# Patient Record
Sex: FEMALE | Race: ASIAN | NOT HISPANIC OR LATINO | Employment: FULL TIME | URBAN - METROPOLITAN AREA
[De-identification: names, ages, dates, MRNs, and addresses within clinical notes are randomized per-mention and may not be internally consistent; named-entity substitution may affect disease eponyms.]

---

## 2017-03-26 ENCOUNTER — HOSPITAL ENCOUNTER (EMERGENCY)
Facility: HOSPITAL | Age: 32
Discharge: HOME | End: 2017-03-26
Attending: EMERGENCY MEDICINE
Payer: MEDICAID

## 2017-03-26 VITALS
TEMPERATURE: 98 F | HEART RATE: 86 BPM | RESPIRATION RATE: 16 BRPM | SYSTOLIC BLOOD PRESSURE: 114 MMHG | WEIGHT: 110 LBS | BODY MASS INDEX: 21.6 KG/M2 | HEIGHT: 60 IN | DIASTOLIC BLOOD PRESSURE: 76 MMHG | OXYGEN SATURATION: 100 %

## 2017-03-26 DIAGNOSIS — J18.9 PNEUMONIA OF RIGHT LOWER LOBE DUE TO INFECTIOUS ORGANISM: ICD-10-CM

## 2017-03-26 DIAGNOSIS — R05.9 COUGH: Primary | ICD-10-CM

## 2017-03-26 LAB — DEPRECATED S PYO AG THROAT QL EIA: NEGATIVE

## 2017-03-26 PROCEDURE — 87081 CULTURE SCREEN ONLY: CPT

## 2017-03-26 PROCEDURE — 87880 STREP A ASSAY W/OPTIC: CPT

## 2017-03-26 PROCEDURE — 99284 EMERGENCY DEPT VISIT MOD MDM: CPT

## 2017-03-26 RX ORDER — DOXYCYCLINE HYCLATE 100 MG
100 TABLET ORAL EVERY 12 HOURS
Qty: 20 TABLET | Refills: 0 | Status: SHIPPED | OUTPATIENT
Start: 2017-03-26 | End: 2017-04-05

## 2017-03-26 NOTE — ED AVS SNAPSHOT
[facility]  [address] [place] [address]               Thu Jocy Murray   3/26/2017  6:28 AM   ED    Description:  Female : 1985   Department:  [facility]           Your Care was Coordinated By:     Provider Role From To    Bijal Mccullough MD Attending Provider 17 0630 --      Reason for Visit     Sore Throat     Cough           Diagnoses this Visit        Comments    Cough    -  Primary     Pneumonia of right lower lobe due to infectious organism           ED Disposition     ED Disposition Condition Comment    Discharge             To Do List           Follow-up Information     Follow up with Jose Murray MD. Schedule an appointment as soon as possible for a visit in 2 days.    Specialty:  Internal Medicine    Contact information:    [address]  [place] [id number] [phone]         These Medications        Disp Refills Start End    doxycycline (VIBRA-TABS) 100 MG tablet 20 tablet 0 3/26/2017 2017    Take 1 tablet (100 mg total) by mouth every 12 (twelve) hours. - Oral    Pharmacy: [facility] [place] #: [phone]         [facility] On Call     [facility] -  Assistance  Registered nurses in the [facility] provide clinical advisement, health education, appointment booking, and other advisory services.  Call for this free service at [phone].             Medications           Message regarding Medications     Verify the changes and/or additions to your medication regime listed below are the same as discussed with your clinician today.  If any of these changes or additions are incorrect, please notify your healthcare provider.        START taking these NEW medications        Refills    doxycycline (VIBRA-TABS) 100 MG tablet 0    Sig: Take 1 tablet (100 mg total) by mouth every 12 (twelve) hours.    Class: Print    Route: Oral            Verify that the below list of medications is an accurate representation of the medications you are currently taking.  If none reported, the list may be blank. If incorrect, please contact your healthcare provider. Carry this list with you in case of emergency.           Current Medications     doxycycline (VIBRA-TABS) 100 MG tablet Take 1 tablet (100 mg total) by mouth every 12 (twelve) hours.           Clinical Reference Information           Your Vitals Were     BP Pulse Temp Resp Height Weight    113/64 (BP Location: Right arm, Patient Position: Sitting) 89 98.2 °F (36.8 °C) (Oral) 16 5' (1.524 m) 49.9 kg (110 lb)    SpO2 BMI             98% 21.48 kg/m2         Allergies as of 3/26/2017     No Known Allergies      Immunizations Administered on Date of Encounter - 3/26/2017     None      ED Micro, Lab, POCT     Start Ordered       Status Ordering Provider    03/26/17 0658 03/26/17 0657  Rapid strep screen  STAT      Final result     03/26/17 0657 03/26/17 0657  Strep A culture, throat  Once      In process       ED Imaging Orders     Start Ordered       Status Ordering Provider    03/26/17 0658 03/26/17 0657  X-Ray Chest PA And Lateral  1 time imaging      Final result         Discharge Instructions         Take medications as directed. Follow-up with your primary doctor. Return for any new or worsening complaints.  Viêm Ph?i (Ng??i L?n)  Viêm ph?i là veronica?m trùng sâu bên yina ph?i. Nó ? yina các túi không khí nh? (túi ph?i). Viêm ph?i có th? là do vi rút ho?c vi khu?n. Viêm kh?i do vi khu?n th??ng ???c ?i?u tr? b?ng thu?c tr? sinh. Các tr??ng h?p nghiêm tr?ng có th? c?n ???c ?i?u tr? t?i b?nh vi?n. Các tr??ng h?p nh? h?n có th? ???c ?i?u tr? t?i jassi. Các tri?u ch?ng th??ng b?t ??u ?? h?n yina 2 ngày ??u ?i?u tr?.    Ch?m sóc t?i jassi  Làm marisol các h??ng d?n helder ?ây khi ch?m sóc cho b?n thân ? nhà:  · Ngh? ng?i ? nhà yina t? 2 t?i 3 ngày ??u, ho?c cho t?i khi quý v? c?m th?y lina? h?n. ??ng ?? cho b?n thân  c?a quý v? quá m?t m?i khi tr? l?i v?i các ho?t ??ng c?a mình.  · Tránh xa khói thu?c lá - c?a quý v? ho?c nh?ng ng??i khác.  · Quý v? có th? dùng acetaminophen ho?c ibuprofen ?? ki?m ch? c?n ?au, tr? khi m?t thu?c khác ???c kê toa. N?u quý v? b? b?nh cynthia ho?c th?n mãn tính, hãy bàn v?i nhân viên y t? c?a quý v? tr??c khi dùng các thu?c này. Ngoài ra hãy bàn v?i nhân viên y t? n?u quý v? ?ã t?ng b? loét kavitha t? ho?c ch?y máu yina ???ng tiêu hoá. ??ng ??a aspirin cho b?t c? ai d??i 18 tu?i b? b?nh có lên c?n s?t. Nó có th? làm cynthia b? t?n h?i nghiêm tr?ng.  · S? thèm ?n c?a quý v? có th? kém ?i, vì th? m?t ch? ?? ?n nh? c?ng t?t.  · U?ng t? 6 t?i 8 ly ch?t l?ng m?i ngày ?? ch?c ch?n là quý v? có ?? ch?t l?ng. Các th?c u?ng có th? kavitha g?m n??c, n??c u?ng th? varun, n??c ng?t soda mà không có ch?t cà phê in, n??c ép, trà, ho?c súp. Ch?t l?ng s? giúp làm bong các ch?t d?ch ti?t ra yina ph?i. ?i?u này s? t?o s? d? dàng h?n cho quý v? ?? kh?c ??m ra ngoài (??m). N?u quý v? b? b?nh federica ho?c th?n, hãy ki?m tra v?i nhân viên y t? tr??c khi quý v? u?ng thêm ch?t l?ng.  · Dùng thu?c tr? sinh nh? ?ã ???c kê toa cho t?i khi h?t thu?c, m?c dù quý v? c?m th?y ?? h?n helder m?t vài ngày.  Ch?m sóc marisol dõi  Khám marisol dõi v?i nhân viên y t? c?a quý v? yina t? 2 t?i 3 ngày t?i ?ây, ho?c nh? ?ã ???c c?n d?n. ?i?u này là ?? ch?c ch?n thu?c giúp cho quý v? ???c ?? h?n.  N?u quý v? t? 65 tu?i tr? lên, quý v? nên ?i ch?ng ng?a b?nh do ph? c?u khu?n và chích ng?a cúm hàng n?m (b?nh cúm). Quý v? c?ng nên ch?ng ng?a các lo?i này n?u quý v? b? b?nh ph?i mãn tính nh? b?nh reji?n, b?nh tràn khí, ho?c COPD. H?i bác s? c?a quý v? v? ?i?u này.  Khi nào ?i tìm s? khuyên nh? v? y t?  G?i nhân viên y t? ngay n?u quý v? b? b?t c? nh?ng ?i?u nào helder ?ây:  · Quý v? không ?? h?n yina vòng 48 gi? ??u ?i?u tr?  · Ch?ng th? d?c b? tr?m tr?ng h?n  · Th? nhanh (graves h?n 25 nh?p th? m?i phút)  · Ho kh?c ra máu  · Ch?ng ?au ng?c b? tr?m tr?ng h?n khi hít th?  · S?t  t? 102°F (38°C) tr? lên mà không ?? h?n helder khi dùng thu?c gi?m s?t  · Jessica nh??c, chóng m?t, ho?c ng?t x?u b? tr?m tr?ng h?n  · Khát n??c ho?c khô mi?ng b? tr?m tr?ng h?n  · ?au xoang m?i, nh?c ??u, ho?c c? c?ng  · ?au ng?c không ph?i là do b? ho  Date Last Reviewed: 12/23/2014  © 2543-6591 [employer]. [address] [place] [address], [place] [address]. All rights reserved. This information is not intended as a substitute for professional medical care. Always follow your healthcare professional's instructions.          [facility] Sign-Up     Activating your [facility] account is as easy as 1-2-3!     1) Visit [email or web address], select Sign Up Now, enter this activation code and your date of birth, then select Next.  [email or web address]  Expires: 5/10/2017  8:10 AM      2) Create a username and password to use when you visit [facility] in the future and select a security question in case you lose your password and select Next.    3) Enter your e-mail address and click Sign Up!    Additional Information  If you have questions, please e-mail [email or web address] or call [phone] to talk to our [facility] staff. Remember, [facility] is NOT to be used for urgent needs. For medical emergencies, dial 911.          [facility] complies with applicable Federal civil rights laws and does not discriminate on the basis of race, color, national origin, age, disability, or sex.        Language Assistance Services     ATTENTION: Language assistance services are available, free of charge. Please call [phone].      ATENCIÓN: Si habla español, tiene a paul disposición servicios gratuitos de asistencia lingüística. Llame al [phone].     CHÚ Ý: N?u b?n nói Ti?ng Vi?t, có các d?ch v? h? tr? ngôn ng? mi?n phí dành cho b?n. G?i s? [phone].

## 2017-03-26 NOTE — ED NOTES
Pt report received from AZALEA holley. Pt is awake in bed, siderails are up x2, call light in reach. NAD at this time.

## 2017-03-26 NOTE — ED TRIAGE NOTES
32 yo female arrives to ED c/o cough 2-3 days; pt states that she did have a sore throat but it is improving with OTC medication; Pt states that she noticed when she sleeps on left side she has some chest pain. Denies N/V/D; pt woke up around 0200 with difficulty breathing but resolved on own

## 2017-03-26 NOTE — DISCHARGE INSTRUCTIONS
Take medications as directed. Follow-up with your primary doctor. Return for any new or worsening complaints.  Viêm Ph?i (Ng??i L?n)  Viêm ph?i là veronica?m trùng sâu bên yina ph?i. Nó ? yina các túi không khí nh? (túi ph?i). Viêm ph?i có th? là do vi rút ho?c vi khu?n. Viêm kh?i do vi khu?n th??ng ???c ?i?u tr? b?ng thu?c tr? sinh. Các tr??ng h?p nghiêm tr?ng có th? c?n ???c ?i?u tr? t?i b?nh vi?n. Các tr??ng h?p nh? h?n có th? ???c ?i?u tr? t?i jassi. Các tri?u ch?ng th??ng b?t ??u ?? h?n yina 2 ngày ??u ?i?u tr?.    Ch?m sóc t?i jassi  Làm marisol các h??ng d?n helder ?ây khi ch?m sóc cho b?n thân ? nhà:  · Ngh? ng?i ? nhà yina t? 2 t?i 3 ngày ??u, ho?c cho t?i khi quý v? c?m th?y lina? h?n. ??ng ?? cho b?n thân c?a quý v? quá m?t m?i khi tr? l?i v?i các ho?t ??ng c?a mình.  · Tránh xa khói thu?c lá - c?a quý v? ho?c nh?ng ng??i khác.  · Quý v? có th? dùng acetaminophen ho?c ibuprofen ?? ki?m ch? c?n ?au, tr? khi m?t thu?c khác ???c kê toa. N?u quý v? b? b?nh cynthia ho?c th?n mãn tính, hãy bàn v?i nhân viên y t? c?a quý v? tr??c khi dùng các thu?c này. Ngoài ra hãy bàn v?i nhân viên y t? n?u quý v? ?ã t?ng b? loét kavitha t? ho?c ch?y máu yina ???ng tiêu hoá. ??ng ??a aspirin cho b?t c? ai d??i 18 tu?i b? b?nh có lên c?n s?t. Nó có th? làm cynthia b? t?n h?i nghiêm tr?ng.  · S? thèm ?n c?a quý v? có th? kém ?i, vì th? m?t ch? ?? ?n nh? c?ng t?t.  · U?ng t? 6 t?i 8 ly ch?t l?ng m?i ngày ?? ch?c ch?n là quý v? có ?? ch?t l?ng. Các th?c u?ng có th? kavitha g?m n??c, n??c u?ng th? varun, n??c ng?t soda mà không có ch?t cà phê in, n??c ép, trà, ho?c súp. Ch?t l?ng s? giúp làm bong các ch?t d?ch ti?t ra yina ph?i. ?i?u này s? t?o s? d? dàng h?n cho quý v? ?? kh?c ??m ra ngoài (??m). N?u quý v? b? b?nh federica ho?c th?n, hãy ki?m tra v?i nhân viên y t? tr??c khi quý v? u?ng thêm ch?t l?ng.  · Dùng thu?c tr? sinh nh? ?ã ???c kê toa cho t?i khi h?t thu?c, m?c dù quý v? c?m th?y ?? h?n helder m?t vài ngày.  Ch?m sóc marisol dõi  Khám marisol dõi v?i nhân  viên y t? c?a quý v? yina t? 2 t?i 3 ngày t?i ?ây, ho?c nh? ?ã ???c c?n d?n. ?i?u này là ?? ch?c ch?n thu?c giúp cho quý v? ???c ?? h?n.  N?u quý v? t? 65 tu?i tr? lên, quý v? nên ?i ch?ng ng?a b?nh do ph? c?u khu?n và chích ng?a cúm hàng n?m (b?nh cúm). Quý v? c?ng nên ch?ng ng?a các lo?i này n?u quý v? b? b?nh ph?i mãn tính nh? b?nh jessica?n, b?nh tràn khí, ho?c COPD. H?i bác s? c?a quý v? v? ?i?u này.  Khi nào ?i tìm s? khuyên nh? v? y t?  G?i nhân viên y t? ngay n?u quý v? b? b?t c? nh?ng ?i?u nào helder ?ây:  · Quý v? không ?? h?n yina vòng 48 gi? ??u ?i?u tr?  · Ch?ng th? d?c b? tr?m tr?ng h?n  · Th? nhanh (graves h?n 25 nh?p th? m?i phút)  · Ho kh?c ra máu  · Ch?ng ?au ng?c b? tr?m tr?ng h?n khi hít th?  · S?t t? 102°F (38°C) tr? lên mà không ?? h?n helder khi dùng thu?c gi?m s?t  · Jessica nh??c, chóng m?t, ho?c ng?t x?u b? tr?m tr?ng h?n  · Khát n??c ho?c khô mi?ng b? tr?m tr?ng h?n  · ?au xoang m?i, nh?c ??u, ho?c c? c?ng  · ?au ng?c không ph?i là do b? ho  Date Last Reviewed: 12/23/2014  © 6557-0368 [employer]. [address] [place] [address], [place] [address]. All rights reserved. This information is not intended as a substitute for professional medical care. Always follow your healthcare professional's instructions.

## 2017-03-26 NOTE — ED PROVIDER NOTES
"Encounter Date: 3/26/2017    SCRIBE #1 NOTE: I, Maria Esther Baldwin, am scribing for, and in the presence of,  Bijal Mccullough MD . I have scribed the following portions of the note - Other sections scribed: HPI and ROS .       History     Chief Complaint   Patient presents with    Sore Throat     states has sore throat,runny nose and cough feels hard to breath at times    Cough     Review of patient's allergies indicates:  No Known Allergies  HPI Comments: CC: Sore Throat and Cough.  History obtained from patient.  Pt arrived via personal transportation.     HPI: This 31 y.o. Female, who has a history of gestational DM with no other medical problems, presents to the ED for evaluation of a 3 day history of sore throat, non-productive cough, and rhinorrhea. Symptoms are gradual in onset, constant, and moderate. She states that she woke this morning with difficulty breathing. The patient also notes that when laying on her left side, she experiences mild chest pain. The patient denies nausea,throat swelling, difficulty swallowing, vomiting, fever, chills, myalgias, headache, or abdominal pain. She reports no further symptoms. Prior attempted treatment with OTC "flu" medication has provided mild relief as her sore throat has improved. The patient has no allergies to medications. She states that her mother, who is still living, has a history of heart problems. No h/o sudden cardiac death in her family. PCP is Dr. Jocy Murray.     The history is provided by the patient. A  was used ([employer] was used to interpret for Burmese speaking patient. ).     Past Medical History:   Diagnosis Date    Diabetes in pregnancy      History reviewed. No pertinent surgical history.  History reviewed. No pertinent family history.  Social History   Substance Use Topics    Smoking status: Never Smoker    Smokeless tobacco: None    Alcohol use No     Review of Systems   Constitutional: Negative for activity change, chills " and fever.   HENT: Positive for rhinorrhea and sore throat. Negative for congestion, drooling, hearing loss, sinus pressure, sneezing, trouble swallowing and voice change.    Eyes: Negative for redness.   Respiratory: Positive for cough. Negative for apnea, choking, shortness of breath, wheezing and stridor.    Cardiovascular: Positive for chest pain. Negative for palpitations and leg swelling.   Gastrointestinal: Negative for abdominal distention, abdominal pain, constipation, nausea and vomiting.   Endocrine: Negative for polydipsia.   Genitourinary: Negative for difficulty urinating, flank pain, frequency and urgency.   Musculoskeletal: Negative for arthralgias, back pain, myalgias, neck pain and neck stiffness.   Skin: Negative for color change, rash and wound.   Neurological: Negative for seizures, syncope, numbness and headaches.   Hematological: Negative for adenopathy.   Psychiatric/Behavioral: Negative for agitation and confusion.       Physical Exam   Initial Vitals   BP Pulse Resp Temp SpO2   03/26/17 0547 03/26/17 0547 03/26/17 0547 03/26/17 0547 03/26/17 0547   113/64 89 16 98.2 °F (36.8 °C) 98 %     Physical Exam    Nursing note and vitals reviewed.  Constitutional: She appears well-developed and well-nourished. She is not diaphoretic. No distress.   HENT:   Head: Normocephalic and atraumatic.   Right Ear: External ear normal.   Left Ear: External ear normal.   Nose: Nose normal.   Mouth/Throat: Oropharynx is clear and moist. No oropharyngeal exudate.   Eyes: Conjunctivae and EOM are normal. Pupils are equal, round, and reactive to light. Right eye exhibits no discharge. Left eye exhibits no discharge. No scleral icterus.   Neck: Normal range of motion. Neck supple. No thyromegaly present. No tracheal deviation present. No JVD present.   Cardiovascular: Normal rate, regular rhythm, normal heart sounds and intact distal pulses. Exam reveals no gallop and no friction rub.    No murmur  heard.  Pulmonary/Chest: Breath sounds normal. No stridor. No respiratory distress. She has no wheezes. She has no rhonchi. She has no rales. She exhibits no tenderness.   Abdominal: Soft. Bowel sounds are normal. She exhibits no distension. There is no tenderness. There is no rebound and no guarding.   Musculoskeletal: Normal range of motion. She exhibits no edema or tenderness.   Neurological: She is alert and oriented to person, place, and time. She has normal strength. She displays normal reflexes. No cranial nerve deficit or sensory deficit.   Skin: Skin is warm and dry. No rash noted. No erythema. No pallor.   Psychiatric: She has a normal mood and affect. Thought content normal.         ED Course   Procedures  Labs Reviewed   THROAT SCREEN, RAPID   CULTURE, STREP A,  THROAT          X-Rays:   Independently Interpreted Readings:   Other Readings:  Chest x-ray was reviewed.  There is faint patchy right airspace opacification.  There is no pneumothorax or pleural effusion.  No cardiomegaly    Medical Decision Makin-year-old female with no significant past medical history presents the emergency department complaining of cough, sore throat as well as body aches and flulike symptoms for several days.  On exam she is afebrile with stable vital signs.  She is nontoxic appearing.  Differential diagnosis includes but is not limited to viral syndrome, bronchitis, pneumonia, strep pharyngitis.  Considered but do not suspect peritonsillar abscess, retropharyngeal abscess, meningitis, sepsis.  Rapid strep screen is negative.  Chest x-ray is consistent with likely pneumonia.  Will discharge with doxycycline.  Follow-up with her primary care doctor.  Return precautions were given.  She agrees this plan.            Scribe Attestation:   Scribe #1: I performed the above scribed service and the documentation accurately describes the services I performed. I attest to the accuracy of the note.    Attending Attestation:            Physician Attestation for Scribe:  Physician Attestation Statement for Scribe #1: I, Bijal Mccullough MD, reviewed documentation, as scribed by Maria Esther Baldwin  in my presence, and it is both accurate and complete.                 ED Course     Clinical Impression:   The primary encounter diagnosis was Cough. A diagnosis of Pneumonia of right lower lobe due to infectious organism was also pertinent to this visit.          Bijal Mccullough MD  03/26/17 1068

## 2017-03-28 LAB — BACTERIA THROAT CULT: NORMAL

## 2018-12-18 ENCOUNTER — OFFICE VISIT (OUTPATIENT)
Dept: OBSTETRICS AND GYNECOLOGY | Facility: CLINIC | Age: 33
End: 2018-12-18
Payer: MEDICAID

## 2018-12-18 VITALS
BODY MASS INDEX: 25.03 KG/M2 | WEIGHT: 119.25 LBS | TEMPERATURE: 99 F | HEIGHT: 58 IN | SYSTOLIC BLOOD PRESSURE: 124 MMHG | DIASTOLIC BLOOD PRESSURE: 68 MMHG

## 2018-12-18 DIAGNOSIS — Z30.09 FAMILY PLANNING: ICD-10-CM

## 2018-12-18 DIAGNOSIS — Z92.0 HISTORY OF USE OF CONTRACEPTIVE INTRAUTERINE DEVICE (IUD): Primary | ICD-10-CM

## 2018-12-18 PROCEDURE — 99203 OFFICE O/P NEW LOW 30 MIN: CPT | Mod: S$PBB,,, | Performed by: OBSTETRICS & GYNECOLOGY

## 2018-12-18 PROCEDURE — 99213 OFFICE O/P EST LOW 20 MIN: CPT | Mod: PBBFAC | Performed by: OBSTETRICS & GYNECOLOGY

## 2018-12-18 PROCEDURE — 99999 PR PBB SHADOW E&M-EST. PATIENT-LVL III: CPT | Mod: PBBFAC,,, | Performed by: OBSTETRICS & GYNECOLOGY

## 2018-12-18 NOTE — PROGRESS NOTES
"  Subjective:       Patient ID: Jennifer Murray is a 33 y.o. female.    Chief Complaint:  Advice Only (IUD consult)      History of Present Illness  HPI  Patient comes in today requesting IUD insertion  Has had an IUD from [place] previously.  Did "OK with some back pain"  However, she does not want to take the pills secondary to being forgetful.  Would like to try the IUD again.  History of menorrhagia.    Pap on 2018 was normal.      GYN & OB History  Patient's last menstrual period was 2018 (exact date).   Date of Last Pap: No result found    OB History    Para Term  AB Living   3 2 2   1 2   SAB TAB Ectopic Multiple Live Births           2      # Outcome Date GA Lbr Jean Pierre/2nd Weight Sex Delivery Anes PTL Lv   3 AB 18           2 Term 11 40w0d  4.1 kg (9 lb 0.6 oz) M  EPI N DONNA   1 Term 04 40w0d  2.9 kg (6 lb 6.3 oz) M  None N DONNA        Past Medical History:   Diagnosis Date    Diabetes in pregnancy     hemoglobin 6.4 in second trimester       History reviewed. No pertinent surgical history.    Family History   Problem Relation Age of Onset    Heart disease Mother     Hypertension Mother        Social History     Socioeconomic History    Marital status:      Spouse name: None    Number of children: None    Years of education: None    Highest education level: None   Social Needs    Financial resource strain: None    Food insecurity - worry: None    Food insecurity - inability: None    Transportation needs - medical: None    Transportation needs - non-medical: None   Occupational History    None   Tobacco Use    Smoking status: Never Smoker    Smokeless tobacco: Never Used   Substance and Sexual Activity    Alcohol use: No    Drug use: No    Sexual activity: Yes     Partners: Male   Other Topics Concern    None   Social History Narrative     since     He is working offshore    She is a manicurist       Current Outpatient Medications "   Medication Sig Dispense Refill    FLUZONE QUAD 5206-2481, PF, 60 mcg (15 mcg x 4)/0.5 mL Syrg        No current facility-administered medications for this visit.        Review of patient's allergies indicates:  No Known Allergies    Review of Systems  Review of Systems   Constitutional: Negative for activity change, appetite change, chills, fatigue, fever and unexpected weight change.   HENT: Negative for mouth sores.    Respiratory: Negative for cough, shortness of breath and wheezing.    Cardiovascular: Negative for chest pain and palpitations.   Gastrointestinal: Negative for abdominal pain, bloating, blood in stool, constipation, nausea and vomiting.   Endocrine: Negative for diabetes and hot flashes.   Genitourinary: Negative for dyspareunia, dysuria, frequency, hematuria, menorrhagia, menstrual problem, pelvic pain, urgency, vaginal bleeding, vaginal discharge, vaginal pain, dysmenorrhea, urinary incontinence, postcoital bleeding and vaginal odor.   Musculoskeletal: Negative for back pain and myalgias.   Neurological: Negative for seizures and headaches.   Psychiatric/Behavioral: Negative for depression and sleep disturbance. The patient is not nervous/anxious.    Breast: Negative for mass, mastodynia and nipple discharge          Objective:    Physical Exam:   Constitutional: She appears well-developed and well-nourished. No distress.    HENT:   Head: Normocephalic and atraumatic.    Eyes: EOM are normal.    Neck: Normal range of motion.     Pulmonary/Chest: Effort normal. No respiratory distress.        Abdominal: Soft. She exhibits no distension. There is no tenderness. There is no rebound and no guarding.     Genitourinary: Vagina normal and uterus normal. No vaginal discharge found.   Genitourinary Comments: Vulva without any obvious lesions.  Vaginal vault with good support.  Minimal white discharge noted.  No obvious lesion.  Cervix is without any cervical motion tenderness.  No obvious lesion.   Uterus is small, retroverted, non-tender, normal contour.  Adnexa is without any masses or tenderness.           Musculoskeletal: Normal range of motion.       Neurological: She is alert.    Skin: Skin is warm and dry.    Psychiatric: She has a normal mood and affect.          Assessment:        1. History of use of contraceptive intrauterine device (IUD)    2. Family planning              Plan:      I have discussed with the patient regarding her condition  She would like to get the IUD  Mirena would be appropriate considering her menorrhagia    We will order the device  She will be back for insertion.

## 2018-12-31 ENCOUNTER — TELEPHONE (OUTPATIENT)
Dept: PHARMACY | Facility: CLINIC | Age: 33
End: 2018-12-31

## 2018-12-31 NOTE — TELEPHONE ENCOUNTER
[facility] DALAL Staff; Quincy Peacock MD 9 minutes ago (2:48 PM)      Good Afternoon     We will deliver the Mirena on 2019 to [address] [facility] [place] [id number]     Thank you   Karina   [phone]    Routing Comment       Karina [place]   [facility]  [phone]  9 minutes ago (2:48 PM)      Spoke with the patient she verified . Pt gave consent to ship IUD to [facility] and declined consultation.    Outgoing call

## 2019-01-14 ENCOUNTER — PROCEDURE VISIT (OUTPATIENT)
Dept: OBSTETRICS AND GYNECOLOGY | Facility: CLINIC | Age: 34
End: 2019-01-14
Payer: MEDICAID

## 2019-01-14 VITALS
BODY MASS INDEX: 25.12 KG/M2 | WEIGHT: 119.69 LBS | SYSTOLIC BLOOD PRESSURE: 110 MMHG | DIASTOLIC BLOOD PRESSURE: 70 MMHG | HEIGHT: 58 IN | TEMPERATURE: 99 F

## 2019-01-14 DIAGNOSIS — Z30.430 ENCOUNTER FOR INSERTION OF MIRENA IUD: Primary | ICD-10-CM

## 2019-01-14 LAB
B-HCG UR QL: NEGATIVE
CTP QC/QA: YES

## 2019-01-14 PROCEDURE — 58300 INSERTION OF IUD-TODAY: ICD-10-PCS | Mod: S$PBB,,, | Performed by: OBSTETRICS & GYNECOLOGY

## 2019-01-14 PROCEDURE — 81025 URINE PREGNANCY TEST: CPT | Mod: PBBFAC | Performed by: OBSTETRICS & GYNECOLOGY

## 2019-01-14 PROCEDURE — 58300 INSERT INTRAUTERINE DEVICE: CPT | Mod: PBBFAC | Performed by: OBSTETRICS & GYNECOLOGY

## 2019-01-14 RX ADMIN — LEVONORGESTREL 1 INTRA UTERINE DEVICE: 52 INTRAUTERINE DEVICE INTRAUTERINE at 11:01

## 2019-01-14 NOTE — PROCEDURES
Insertion of IUD-Today  Date/Time: 1/14/2019 11:52 AM  Performed by: Quincy Peacock MD  Authorized by: Quincy Peacock MD     Consent:     Consent obtained:  Written    Consent given by:  Patient    Procedure risks and benefits discussed: yes      Patient questions answered: yes      Patient agrees, verbalizes understanding, and wants to proceed: yes      Educational handouts given: yes      Instructions and paperwork completed: yes    Procedure:     Pelvic exam performed: yes      Negative GC/chlamydia test: no      Negative urine pregnancy test: yes      Negative serum pregnancy test: no      Cervix cleaned and prepped: yes      Speculum placed in vagina: yes      Tenaculum applied to cervix: yes      Uterus sounded: yes      Uterus sound depth (cm):  8.5    IUD inserted with no complications: yes      IUD type:  Mirena    Strings trimmed: yes    Post-procedure:     Patient tolerated procedure well: yes      Patient will follow up after next period: yes        We began our procedure by going over the risks and benefits of the IUD along with alternative methods of contraception.  All questions answered.  Consents signed.  The patient wished to proceed.    A clean speculum was placed into the vaginal vault.  Patient was not allergic to Betadine.  Vagina and cervix were then cleaned with Betadine including the cervical canal.  Single-tooth tenaculum was used to grasp anterior lip of the cervix. The uterus was sounded to 8.5  cm. The IUD, Mirena, previously shown to the patient, was then placed to the depth of the fundus without any difficulty in the standard manner.  Strings were then cut using Santana scissors to a length of 1.5 inches.  All instruments were then removed.  Silver nitrate was used to obtain hemostasis at the tenaculum site.    Patient tolerated the procedure well without any complications.  Post-procedure pain was rated at 2-3/10.  Educational material given.    Patient was told to take over-the-counter  Motrin, 2-3 tablets every 6 hours as needed for pain. She will refrain from having intercourse for the next 2 weeks.    Patient will be back for follow-up in 4 weeks.    Lot number: [id number]  Expiration date: 6/2021

## 2019-02-12 ENCOUNTER — OFFICE VISIT (OUTPATIENT)
Dept: OBSTETRICS AND GYNECOLOGY | Facility: CLINIC | Age: 34
End: 2019-02-12
Payer: MEDICAID

## 2019-02-12 VITALS
WEIGHT: 121.06 LBS | HEIGHT: 58 IN | TEMPERATURE: 99 F | SYSTOLIC BLOOD PRESSURE: 110 MMHG | DIASTOLIC BLOOD PRESSURE: 63 MMHG | BODY MASS INDEX: 25.41 KG/M2

## 2019-02-12 DIAGNOSIS — Z30.431 IUD CHECK UP: Primary | ICD-10-CM

## 2019-02-12 PROCEDURE — 99999 PR PBB SHADOW E&M-EST. PATIENT-LVL III: CPT | Mod: PBBFAC,,, | Performed by: OBSTETRICS & GYNECOLOGY

## 2019-02-12 PROCEDURE — 99213 OFFICE O/P EST LOW 20 MIN: CPT | Mod: S$PBB,,, | Performed by: OBSTETRICS & GYNECOLOGY

## 2019-02-12 PROCEDURE — 99213 OFFICE O/P EST LOW 20 MIN: CPT | Mod: PBBFAC | Performed by: OBSTETRICS & GYNECOLOGY

## 2019-02-12 PROCEDURE — 99999 PR PBB SHADOW E&M-EST. PATIENT-LVL III: ICD-10-PCS | Mod: PBBFAC,,, | Performed by: OBSTETRICS & GYNECOLOGY

## 2019-02-12 PROCEDURE — 99213 PR OFFICE/OUTPT VISIT, EST, LEVL III, 20-29 MIN: ICD-10-PCS | Mod: S$PBB,,, | Performed by: OBSTETRICS & GYNECOLOGY

## 2019-02-12 NOTE — PROGRESS NOTES
Subjective:       Patient ID: Jennifer Murray is a 33 y.o. female.    Chief Complaint:  IUD Check (4 wks follow up Mirena inserted on 19)      History of Present Illness  HPI  Patient comes in today for follow-up  Status post Mirena insertion 2019  Has been spotting.    Very light cycle earlier this morning.      GYN & OB History  Patient's last menstrual period was 2019 (exact date).   Date of Last Pap: 2018    OB History    Para Term  AB Living   3 2 2   1 2   SAB TAB Ectopic Multiple Live Births           2      # Outcome Date GA Lbr Jean Pierre/2nd Weight Sex Delivery Anes PTL Lv   3 AB 18           2 Term 11 40w0d  4.1 kg (9 lb 0.6 oz) M  EPI N DONNA   1 Term 04 40w0d  2.9 kg (6 lb 6.3 oz) M  None N DONNA        Past Medical History:   Diagnosis Date    Diabetes in pregnancy     hemoglobin 6.4 in second trimester       History reviewed. No pertinent surgical history.    Family History   Problem Relation Age of Onset    Heart disease Mother     Hypertension Mother        Social History     Socioeconomic History    Marital status:      Spouse name: None    Number of children: None    Years of education: None    Highest education level: None   Social Needs    Financial resource strain: None    Food insecurity - worry: None    Food insecurity - inability: None    Transportation needs - medical: None    Transportation needs - non-medical: None   Occupational History    None   Tobacco Use    Smoking status: Never Smoker    Smokeless tobacco: Never Used   Substance and Sexual Activity    Alcohol use: No    Drug use: No    Sexual activity: Yes     Partners: Male   Other Topics Concern    None   Social History Narrative     since     He is working offshore    She is a manicurist       Current Outpatient Medications   Medication Sig Dispense Refill    FLUZONE QUAD [id number], PF, 60 mcg (15 mcg x 4)/0.5 mL Syrg       levonorgestrel (MIRENA) 20  mcg/24 hr (5 years) IUD 1 Intra Uterine Device by Intrauterine route once. for 1 dose 1 Intra Uterine Device 0    ranitidine (ZANTAC) 150 MG tablet Take 150 mg by mouth 2 (two) times daily as needed.  1     No current facility-administered medications for this visit.        Review of patient's allergies indicates:  No Known Allergies    Review of Systems  Review of Systems   Constitutional: Negative for activity change, appetite change, chills, fatigue, fever and unexpected weight change.   HENT: Negative for mouth sores.    Respiratory: Negative for cough, shortness of breath and wheezing.    Cardiovascular: Negative for chest pain and palpitations.   Gastrointestinal: Negative for abdominal pain, bloating, blood in stool, constipation, nausea and vomiting.   Endocrine: Negative for diabetes and hot flashes.   Genitourinary: Positive for vaginal bleeding. Negative for dysmenorrhea, dyspareunia, dysuria, frequency, hematuria, menorrhagia, menstrual problem, pelvic pain, urgency, vaginal discharge, vaginal pain, urinary incontinence, postcoital bleeding and vaginal odor.   Musculoskeletal: Negative for back pain and myalgias.   Neurological: Negative for seizures and headaches.   Psychiatric/Behavioral: Negative for depression and sleep disturbance. The patient is not nervous/anxious.    Breast: Negative for mass, mastodynia and nipple discharge          Objective:    Physical Exam:   Constitutional: She appears well-developed and well-nourished. No distress.    HENT:   Head: Normocephalic and atraumatic.    Eyes: EOM are normal.    Neck: Normal range of motion.     Pulmonary/Chest: Effort normal. No respiratory distress.        Abdominal: Soft. She exhibits no distension. There is no tenderness. There is no rebound and no guarding.     Genitourinary: Uterus normal. Vaginal discharge found.   Genitourinary Comments: Vulva without any obvious lesions.  Vaginal vault with good support.  Minimal bloody discharge noted.   No obvious lesion.  Cervix is without any cervical motion tenderness.  No obvious lesion.  Mirena strings visible.  Uterus is small, non-tender, normal contour.  Adnexa is without any masses or tenderness.           Musculoskeletal: Normal range of motion.       Neurological: She is alert.    Skin: Skin is warm and dry.    Psychiatric: She has a normal mood and affect.          Assessment:        1. IUD check up             Plan:      I have discussed with the patient regarding her condition  She is doing well so far with her Mirena    Back next year for her annual visit.

## 2019-04-30 ENCOUNTER — OFFICE VISIT (OUTPATIENT)
Dept: OBSTETRICS AND GYNECOLOGY | Facility: CLINIC | Age: 34
End: 2019-04-30
Payer: MEDICAID

## 2019-04-30 VITALS
WEIGHT: 119.94 LBS | HEIGHT: 58 IN | BODY MASS INDEX: 25.17 KG/M2 | SYSTOLIC BLOOD PRESSURE: 120 MMHG | DIASTOLIC BLOOD PRESSURE: 62 MMHG

## 2019-04-30 PROCEDURE — 87491 CHLMYD TRACH DNA AMP PROBE: CPT

## 2019-04-30 PROCEDURE — 99999 PR PBB SHADOW E&M-EST. PATIENT-LVL III: ICD-10-PCS | Mod: PBBFAC,,, | Performed by: OBSTETRICS & GYNECOLOGY

## 2019-04-30 PROCEDURE — 99213 PR OFFICE/OUTPT VISIT, EST, LEVL III, 20-29 MIN: ICD-10-PCS | Mod: S$PBB,,, | Performed by: OBSTETRICS & GYNECOLOGY

## 2019-04-30 PROCEDURE — 99999 PR PBB SHADOW E&M-EST. PATIENT-LVL III: CPT | Mod: PBBFAC,,, | Performed by: OBSTETRICS & GYNECOLOGY

## 2019-04-30 PROCEDURE — 99213 OFFICE O/P EST LOW 20 MIN: CPT | Mod: S$PBB,,, | Performed by: OBSTETRICS & GYNECOLOGY

## 2019-04-30 PROCEDURE — 99213 OFFICE O/P EST LOW 20 MIN: CPT | Mod: PBBFAC | Performed by: OBSTETRICS & GYNECOLOGY

## 2019-04-30 RX ORDER — ESTRADIOL 1 MG/1
1 TABLET ORAL DAILY
Qty: 10 TABLET | Refills: 0 | Status: SHIPPED | OUTPATIENT
Start: 2019-04-30 | End: 2019-10-01

## 2019-04-30 NOTE — PROGRESS NOTES
Subjective:       Patient ID: Jennifer Murray is a 33 y.o. female.    Chief Complaint:  IUD Check (IUD issue, wish to have device removed due to intermenstrual spotting)      History of Present Illness  HPI  Patient comes in today for follow-up  Has had the Mirena since 2019  Been spotting off and on since.  No other complaint  Denies fever or chills.  No nausea or vomiting.  No pain  Same partner.        GYN & OB History  No LMP recorded. Patient has had an implant.   Date of Last Pap: No result found    OB History    Para Term  AB Living   3 2 2   1 2   SAB TAB Ectopic Multiple Live Births           2      # Outcome Date GA Lbr Jean Pierre/2nd Weight Sex Delivery Anes PTL Lv   3 AB 18           2 Term 11 40w0d  4.1 kg (9 lb 0.6 oz) M  EPI N DONNA   1 Term 04 40w0d  2.9 kg (6 lb 6.3 oz) M  None N DONNA     Past Medical History:   Diagnosis Date    Diabetes in pregnancy     hemoglobin 6.4 in second trimester       History reviewed. No pertinent surgical history.    Family History   Problem Relation Age of Onset    Heart disease Mother     Hypertension Mother        Social History     Socioeconomic History    Marital status:      Spouse name: Not on file    Number of children: Not on file    Years of education: Not on file    Highest education level: Not on file   Occupational History    Not on file   Social Needs    Financial resource strain: Not on file    Food insecurity:     Worry: Not on file     Inability: Not on file    Transportation needs:     Medical: Not on file     Non-medical: Not on file   Tobacco Use    Smoking status: Never Smoker    Smokeless tobacco: Never Used   Substance and Sexual Activity    Alcohol use: No    Drug use: No    Sexual activity: Yes     Partners: Male   Lifestyle    Physical activity:     Days per week: Not on file     Minutes per session: Not on file    Stress: Not on file   Relationships    Social connections:     Talks on  phone: Not on file     Gets together: Not on file     Attends [employer] service: Not on file     Active member of club or organization: Not on file     Attends meetings of clubs or organizations: Not on file     Relationship status: Not on file   Other Topics Concern    Not on file   Social History Narrative     since 2003    He is working offshore    She is a manicurist       Current Outpatient Medications   Medication Sig Dispense Refill    FLUZONE QUAD [id number], PF, 60 mcg (15 mcg x 4)/0.5 mL Syrg       levonorgestrel (MIRENA) 20 mcg/24 hr (5 years) IUD 1 Intra Uterine Device by Intrauterine route once. for 1 dose 1 Intra Uterine Device 0    ranitidine (ZANTAC) 150 MG tablet Take 150 mg by mouth 2 (two) times daily as needed.  1    estradiol (ESTRACE) 1 MG tablet Take 1 tablet (1 mg total) by mouth once daily. 10 tablet 0     No current facility-administered medications for this visit.        Review of patient's allergies indicates:  No Known Allergies    Review of Systems  Review of Systems   Constitutional: Negative for activity change, appetite change, chills, fatigue, fever and unexpected weight change.   HENT: Negative for mouth sores.    Respiratory: Negative for cough, shortness of breath and wheezing.    Cardiovascular: Negative for chest pain and palpitations.   Gastrointestinal: Negative for abdominal pain, bloating, blood in stool, constipation, nausea and vomiting.   Endocrine: Negative for diabetes and hot flashes.   Genitourinary: Positive for menorrhagia, menstrual problem, vaginal bleeding and vaginal discharge. Negative for dysmenorrhea, dyspareunia, dysuria, frequency, hematuria, pelvic pain, urgency, vaginal pain, urinary incontinence, postcoital bleeding and vaginal odor.   Musculoskeletal: Negative for back pain and myalgias.   Integumentary:  Negative for rash, breast mass and nipple discharge.   Neurological: Negative for seizures and headaches.   Psychiatric/Behavioral:  Negative for depression and sleep disturbance. The patient is not nervous/anxious.    Breast: Negative for mass, mastodynia and nipple discharge          Objective:    Physical Exam:   Constitutional: She appears well-developed and well-nourished. No distress.    HENT:   Head: Normocephalic and atraumatic.    Eyes: EOM are normal.    Neck: Normal range of motion.     Pulmonary/Chest: Effort normal. No respiratory distress.        Abdominal: Soft. She exhibits no distension. There is no tenderness. There is no rebound and no guarding.     Genitourinary: Uterus normal. Vaginal discharge found.   Genitourinary Comments: Vulva without any obvious lesions.  Vaginal vault with good support.  Minimal bloody discharge noted.  No obvious lesion.  Cervix is without any cervical motion tenderness.  No obvious lesion.  Mirena strings visible. Uterus is small, non-tender, normal contour.  Adnexa is without any masses or tenderness.           Musculoskeletal: Normal range of motion.       Neurological: She is alert.    Skin: Skin is warm and dry.    Psychiatric: She has a normal mood and affect.          Assessment:        1. Intermenstrual spotting due to IUD, initial encounter             Plan:      I have discussed with the patient regarding her condition  She is frustrated but does not want to have the IUD removed  Gonorrhea and chlamydia  Estradiol   Back in 4 weeks

## 2019-05-02 LAB
C TRACH DNA SPEC QL NAA+PROBE: NOT DETECTED
N GONORRHOEA DNA SPEC QL NAA+PROBE: NOT DETECTED

## 2019-08-08 PROCEDURE — 81025 URINE PREGNANCY TEST: CPT | Performed by: NURSE PRACTITIONER

## 2019-08-08 PROCEDURE — 99284 EMERGENCY DEPT VISIT MOD MDM: CPT

## 2019-08-09 ENCOUNTER — HOSPITAL ENCOUNTER (EMERGENCY)
Facility: HOSPITAL | Age: 34
Discharge: HOME | End: 2019-08-09
Attending: EMERGENCY MEDICINE
Payer: MEDICAID

## 2019-08-09 VITALS
BODY MASS INDEX: 25.8 KG/M2 | OXYGEN SATURATION: 98 % | SYSTOLIC BLOOD PRESSURE: 102 MMHG | RESPIRATION RATE: 18 BRPM | DIASTOLIC BLOOD PRESSURE: 67 MMHG | TEMPERATURE: 98 F | HEART RATE: 75 BPM | HEIGHT: 59 IN | WEIGHT: 128 LBS

## 2019-08-09 DIAGNOSIS — Z30.432 ENCOUNTER FOR IUD REMOVAL: ICD-10-CM

## 2019-08-09 DIAGNOSIS — N93.9 ABNORMAL VAGINAL BLEEDING: Primary | ICD-10-CM

## 2019-08-09 LAB
B-HCG UR QL: NEGATIVE
BACTERIA #/AREA URNS HPF: ABNORMAL /HPF
BACTERIA GENITAL QL WET PREP: ABNORMAL
BILIRUB UR QL STRIP: NEGATIVE
C TRACH DNA SPEC QL NAA+PROBE: NOT DETECTED
CLARITY UR: ABNORMAL
CLUE CELLS VAG QL WET PREP: ABNORMAL
COLOR UR: YELLOW
CTP QC/QA: YES
FILAMENT FUNGI VAG WET PREP-#/AREA: ABNORMAL
GLUCOSE UR QL STRIP: NEGATIVE
HGB UR QL STRIP: ABNORMAL
HYALINE CASTS #/AREA URNS LPF: 0 /LPF
KETONES UR QL STRIP: NEGATIVE
LEUKOCYTE ESTERASE UR QL STRIP: ABNORMAL
MICROSCOPIC COMMENT: ABNORMAL
N GONORRHOEA DNA SPEC QL NAA+PROBE: NOT DETECTED
NITRITE UR QL STRIP: NEGATIVE
PH UR STRIP: >8 [PH] (ref 5–8)
PROT UR QL STRIP: ABNORMAL
RBC #/AREA URNS HPF: >100 /HPF (ref 0–4)
SP GR UR STRIP: 1.01 (ref 1–1.03)
SPECIMEN SOURCE: ABNORMAL
SQUAMOUS #/AREA URNS HPF: 6 /HPF
T VAGINALIS GENITAL QL WET PREP: ABNORMAL
URN SPEC COLLECT METH UR: ABNORMAL
UROBILINOGEN UR STRIP-ACNC: NEGATIVE EU/DL
WBC #/AREA URNS HPF: 4 /HPF (ref 0–5)
WBC #/AREA VAG WET PREP: ABNORMAL
YEAST GENITAL QL WET PREP: ABNORMAL

## 2019-08-09 PROCEDURE — 87210 SMEAR WET MOUNT SALINE/INK: CPT

## 2019-08-09 PROCEDURE — 87491 CHLMYD TRACH DNA AMP PROBE: CPT

## 2019-08-09 PROCEDURE — 81000 URINALYSIS NONAUTO W/SCOPE: CPT

## 2019-08-09 RX ORDER — IBUPROFEN 600 MG/1
600 TABLET ORAL EVERY 6 HOURS PRN
Qty: 20 TABLET | Refills: 0 | Status: SHIPPED | OUTPATIENT
Start: 2019-08-09 | End: 2019-08-14

## 2019-08-09 NOTE — ED TRIAGE NOTES
Patient came to the ED with complaint of continously bleeding with the IUD and she said that her IUD is coming out.

## 2019-08-09 NOTE — ED PROVIDER NOTES
"Encounter Date: 8/8/2019       History     Chief Complaint   Patient presents with    Vaginal Bleeding     pt c/o vaginal bleeding X 1wk. pt also states " my IUD was coming out but i can see it's stuck and I couldn't pull it out."     CC:  Vaginal bleeding    HPI:   34 y/o female presenting for evaluation of possible IUD displacement.  She reports today she felt that her IUD string was coming out.  She has had one-week history of vaginal bleeding approximately 7 pads per day and reports the bleeding became heavier yesterday.  Patient reports she has had similar heavy bleeding and intermenstrual spotting since placement of IUD and 01/2019 of this year by  but review of the chart states that the patient did not want IUD removed at that time.  Patient requesting removal today. Denies abdominal pain, pelvic pain CP, SOB, dizziness lightheadedness, dysuria, urinary urgency or frequency, vaginal discharge, concern for sexually transmitted infection        Review of patient's allergies indicates:  No Known Allergies  Past Medical History:   Diagnosis Date    Diabetes in pregnancy     hemoglobin 6.4 in second trimester     History reviewed. No pertinent surgical history.  Family History   Problem Relation Age of Onset    Heart disease Mother     Hypertension Mother      Social History     Tobacco Use    Smoking status: Never Smoker    Smokeless tobacco: Never Used   Substance Use Topics    Alcohol use: No    Drug use: No     Review of Systems   Constitutional: Negative for chills and fever.   HENT: Negative for trouble swallowing.    Eyes: Negative for redness.   Respiratory: Negative for stridor.    Gastrointestinal: Negative for abdominal pain, nausea and vomiting.   Genitourinary: Positive for menstrual problem and vaginal bleeding. Negative for decreased urine volume, dysuria, flank pain, frequency, pelvic pain, urgency and vaginal discharge.   Musculoskeletal: Negative for back pain and neck pain. "   Skin: Negative for rash.   Neurological: Negative for speech difficulty.   Psychiatric/Behavioral: Negative for confusion.       Physical Exam     Initial Vitals [08/08/19 2150]   BP Pulse Resp Temp SpO2   125/74 92 18 98.3 °F (36.8 °C) 98 %      MAP       --         Physical Exam    Nursing note and vitals reviewed.  Constitutional: She appears well-developed and well-nourished.   HENT:   Head: Normocephalic.   Right Ear: External ear normal.   Left Ear: External ear normal.   Nose: Nose normal.   Eyes: Conjunctivae are normal.   Cardiovascular: Normal rate and regular rhythm. Exam reveals no gallop and no friction rub.    No murmur heard.  Pulmonary/Chest: Breath sounds normal. She has no wheezes. She has no rhonchi. She has no rales.   Abdominal: Soft. Bowel sounds are normal. She exhibits no distension. There is no tenderness. There is no rebound, no guarding, no tenderness at McBurney's point and negative Velazquez's sign.   Genitourinary:   Genitourinary Comments: Chaperoned by Veronique Hampton RN:   Scant amount of dark blood in vaginal vault. IUD string visualized.  No adnexal tenderness or cervical motion tenderness. No palpable masses.   Musculoskeletal: Normal range of motion.   Lymphadenopathy:     She has no cervical adenopathy.   Neurological: She is alert. She has normal strength. No cranial nerve deficit or sensory deficit.   Skin: Skin is warm and dry.   Psychiatric: She has a normal mood and affect.         ED Course   Foreign Body  Date/Time: 8/9/2019 6:18 AM  Performed by: Marva Cody PA-C  Authorized by: Ramy Munson MD   Body area: vagina  Localization method: visualized  Removal mechanism: alligator forceps  1 objects recovered.  Objects recovered: IUD  Post-procedure assessment: foreign body removed  Patient tolerance: Patient tolerated the procedure well with no immediate complications      Labs Reviewed   URINALYSIS, REFLEX TO URINE CULTURE - Abnormal; Notable for the  following components:       Result Value    Appearance, UA Cloudy (*)     pH, UA >8.0 (*)     Protein, UA 1+ (*)     Occult Blood UA 3+ (*)     Leukocytes, UA 3+ (*)     All other components within normal limits   VAGINAL SCREEN - Abnormal; Notable for the following components:    WBC - Vaginal Screen Rare (*)     Bacteria - Vaginal Screen Occasional (*)     All other components within normal limits   URINALYSIS MICROSCOPIC - Abnormal; Notable for the following components:    RBC, UA >100 (*)     All other components within normal limits   C. TRACHOMATIS/N. GONORRHOEAE BY AMP DNA   POCT URINE PREGNANCY          Imaging Results    None          Medical Decision Making:   Initial Assessment:   33-year-old female presenting for evaluation of possible IUD displacement.  She reports she follow up the string longer today.  Denies any pelvic pain abdominal pain.  Does endorse 1 week history of vaginal bleeding.  Denies symptomatic anemia.  Patient is afebrile, well-appearing at distress.  Exam above.  IUD removed per procedure note per patient's request.  Instructed patient to use contraception if pregnancy is not desired.  Told her to Contact OBGYN today to inform him of removal.  Follow up with OBGYN in 1-2 days or return emergency department for worsening symptoms or as needed.                      Clinical Impression:       ICD-10-CM ICD-9-CM   1. Abnormal vaginal bleeding N93.9 623.8   2. Encounter for IUD removal Z30.432 V25.12                                Marva Cody PA-C  08/09/19 0621

## 2019-08-09 NOTE — DISCHARGE INSTRUCTIONS
Take Ibuprofen as needed for pain.   Follow up with primary care and OBGYN in 1-2 days. Return to ER for worsening symptoms or as needed.

## 2019-10-01 ENCOUNTER — OFFICE VISIT (OUTPATIENT)
Dept: OBSTETRICS AND GYNECOLOGY | Facility: CLINIC | Age: 34
End: 2019-10-01
Payer: MEDICAID

## 2019-10-01 VITALS
SYSTOLIC BLOOD PRESSURE: 110 MMHG | DIASTOLIC BLOOD PRESSURE: 80 MMHG | BODY MASS INDEX: 25.63 KG/M2 | WEIGHT: 127.13 LBS | HEIGHT: 59 IN

## 2019-10-01 DIAGNOSIS — Z01.419 WELL WOMAN EXAM WITH ROUTINE GYNECOLOGICAL EXAM: Primary | ICD-10-CM

## 2019-10-01 PROCEDURE — 99395 PREV VISIT EST AGE 18-39: CPT | Mod: S$PBB,,, | Performed by: OBSTETRICS & GYNECOLOGY

## 2019-10-01 PROCEDURE — 99999 PR PBB SHADOW E&M-EST. PATIENT-LVL III: ICD-10-PCS | Mod: PBBFAC,,, | Performed by: OBSTETRICS & GYNECOLOGY

## 2019-10-01 PROCEDURE — 99999 PR PBB SHADOW E&M-EST. PATIENT-LVL III: CPT | Mod: PBBFAC,,, | Performed by: OBSTETRICS & GYNECOLOGY

## 2019-10-01 PROCEDURE — 99213 OFFICE O/P EST LOW 20 MIN: CPT | Mod: PBBFAC | Performed by: OBSTETRICS & GYNECOLOGY

## 2019-10-01 PROCEDURE — 99395 PR PREVENTIVE VISIT,EST,18-39: ICD-10-PCS | Mod: S$PBB,,, | Performed by: OBSTETRICS & GYNECOLOGY

## 2019-10-01 NOTE — PROGRESS NOTES
"[facility]  Ambulatory Clinic  Obstetrics & Gynecology    Visit Date:  10/1/2019    Chief Complaint:  Annual GYN exam    History of Present Illness:      Jennifer Murray is a 34 y.o. , new pt to me, here for a gynecologic exam.    Pt has no major complaints today.      Menses are regular, not heavy or painful.    Pt current method of family planning is condoms, and reports no problems with this method.      Pt denies family h/o adverse reaction to hormonal contraception.    Pt denies h/o abnormal pap, last pap ~2018.    Pt denies active sexually transmitted infections.    Pt performs monthly self breast examination, non-smoker, uses seat belts, and denies abuse.     Pt denies abnormal vaginal bleeding, vaginal discharge, dysmenorrhea, dyspareunia, pelvic pain, bloating, early satiety, unintentional weight loss, breast mass/skin changes, incontinence, GI or urinary complaints.      Otherwise, the pt is in her usual state of health.    Past History:  Gynecologic history as noted above.    Review of Systems:      GENERAL:  No fever, fatigue, excessive weight gain or loss  HEENT:  No headaches, hearing changes, visual disturbance  RESPIRATORY:  No cough, shortness of breath  CARDIOVASCULAR:  No chest pain, heart palpitations, leg swelling  BREAST:  No lump, pain, nipple discharge, skin changes  GASTROINTESTINAL:  No nausea, vomiting, constipation, diarrhea, abd pain, rectal bleeding   GENITOURINARY:  See HPI  ENDOCRINE:  No heat or cold intolerance  HEMATOLOGIC:  No easy bruisability or bleeding   LYMPHATICS:  No enlarged nodes  MUSCULOSKELETAL:  No acute joint pain or swelling  SKIN:  No rash, lesions, jaundice  NEUROLOGIC:  No dizziness, weakness, syncope  PSYCHIATRIC:  No significant mood changes, homicidal/suicidal ideations    Physical Exam:     /80 (BP Location: Left arm, Patient Position: Sitting, BP Method: Medium (Manual))   Ht 4' 11" (1.499 m)   Wt 57.6 kg (127 lb 1.5 oz) "   BMI 25.67 kg/m²   Pulse 70, Resp rate 16     GENERAL:  No acute distress, well-nourished  HEENT:  Atraumatic, anicteric, moist mucus membranes. Neck supple w/o masses.  BREAST:  Symmetric, nontender, no obvious masses, adenopathy, skin changes or nipple discharge.  LUNGS:  Clear to auscultation  HEART:  Regular rate and rhythm, no murmurs, gallops, or rubs  ABDOMEN:  Soft, non-tender, non-distended, normoactive bowel sounds, no obvious organomegaly  EXT:  Symmetric w/o cramping, claudication, or edema. +2 distal pulses.  SKIN:  No rashes or bruising  PSYCH:  Mood and affect appropriate  NEURO:  Grossly intact bilaterally     GENITOURINARY:    VULVAR:  Female external genitalia w/o obvious lesions. Female hair distribution. Normal urethral meatus. No gross lymphadenopathy.    VAGINA:  Pink, moist, well-rugated. Good support. No obvious lesion. No discharge.  CERVIX:  No cervical motion tenderness, discharge, or obvious lesions.   UTERUS:  Small, non-tender, normal contour  ADNEXA:  No masses, non-tender    RECTAL:  Declined. No obvious external lesions    Chaperone present for exam.    Assessment:     34 y.o. :    1. Well woman gynecologic exam    Plan:    A gynecologic health assessment was performed with age appropriate counseling.    Cervical cancer screening - pap obtained.    STI screening - pt declined.  Safe sex discussed.      Encourage healthy lifestyle modifications, monthly self breast exams, Ca/Vit D.    F/u with PCP for health maintenance.    Return 1 year for gynecologic exam, or sooner as needed.  All questions answered, pt voiced understanding.        Brannon Murray MD

## 2019-10-29 ENCOUNTER — OFFICE VISIT (OUTPATIENT)
Dept: OBSTETRICS AND GYNECOLOGY | Facility: CLINIC | Age: 34
End: 2019-10-29
Payer: MEDICAID

## 2019-10-29 VITALS
BODY MASS INDEX: 25.6 KG/M2 | DIASTOLIC BLOOD PRESSURE: 70 MMHG | WEIGHT: 127 LBS | SYSTOLIC BLOOD PRESSURE: 120 MMHG | HEIGHT: 59 IN

## 2019-10-29 DIAGNOSIS — N92.1 MENORRHAGIA WITH IRREGULAR CYCLE: Primary | ICD-10-CM

## 2019-10-29 PROCEDURE — 87491 CHLMYD TRACH DNA AMP PROBE: CPT

## 2019-10-29 PROCEDURE — 99999 PR PBB SHADOW E&M-EST. PATIENT-LVL III: CPT | Mod: PBBFAC,,, | Performed by: OBSTETRICS & GYNECOLOGY

## 2019-10-29 PROCEDURE — 99999 PR PBB SHADOW E&M-EST. PATIENT-LVL III: ICD-10-PCS | Mod: PBBFAC,,, | Performed by: OBSTETRICS & GYNECOLOGY

## 2019-10-29 PROCEDURE — 99213 OFFICE O/P EST LOW 20 MIN: CPT | Mod: S$PBB,,, | Performed by: OBSTETRICS & GYNECOLOGY

## 2019-10-29 PROCEDURE — 99213 OFFICE O/P EST LOW 20 MIN: CPT | Mod: PBBFAC | Performed by: OBSTETRICS & GYNECOLOGY

## 2019-10-29 PROCEDURE — 99213 PR OFFICE/OUTPT VISIT, EST, LEVL III, 20-29 MIN: ICD-10-PCS | Mod: S$PBB,,, | Performed by: OBSTETRICS & GYNECOLOGY

## 2019-10-29 RX ORDER — LEVONORGESTREL AND ETHINYL ESTRADIOL 0.1-0.02MG
1 KIT ORAL DAILY
Qty: 30 TABLET | Refills: 6 | Status: SHIPPED | OUTPATIENT
Start: 2019-10-29 | End: 2020-01-07 | Stop reason: SDUPTHER

## 2019-10-29 NOTE — PROGRESS NOTES
Subjective:       Patient ID: Jennifer Murray is a 34 y.o. female.    Chief Complaint:  Vaginal Bleeding (irregular menses LMP: 10/19/19, postcoital bleeding)      History of Present Illness  HPI  Patient comes in today complaining of having irregular heavy menses.  Had a Mirena since 2019.  Spotting then bleeding heavy.  She went to our Emergency Department on 2019 and had the device removed by our ED staff.  Now she is with irregular bleeding, up to several times a month.  Sometimes also postcoital.    No other complaint.      GYN & OB History  Patient's last menstrual period was 10/19/2019 (exact date).   Date of Last Pap: No result found    OB History    Para Term  AB Living   3 2 2   1 2   SAB TAB Ectopic Multiple Live Births           2      # Outcome Date GA Lbr Jean Pierre/2nd Weight Sex Delivery Anes PTL Lv   3 AB 18           2 Term 11 40w0d  4.1 kg (9 lb 0.6 oz) M  EPI N DONNA   1 Term 04 40w0d  2.9 kg (6 lb 6.3 oz) M  None N DONNA     Past Medical History:   Diagnosis Date    Diabetes in pregnancy     hemoglobin 6.4 in second trimester       History reviewed. No pertinent surgical history.    Family History   Problem Relation Age of Onset    Heart disease Mother     Hypertension Mother        Social History     Socioeconomic History    Marital status:      Spouse name: Not on file    Number of children: Not on file    Years of education: Not on file    Highest education level: Not on file   Occupational History    Not on file   Social Needs    Financial resource strain: Not on file    Food insecurity:     Worry: Not on file     Inability: Not on file    Transportation needs:     Medical: Not on file     Non-medical: Not on file   Tobacco Use    Smoking status: Never Smoker    Smokeless tobacco: Never Used   Substance and Sexual Activity    Alcohol use: No    Drug use: No    Sexual activity: Yes     Partners: Male   Lifestyle    Physical activity:      Days per week: Not on file     Minutes per session: Not on file    Stress: Not on file   Relationships    Social connections:     Talks on phone: Not on file     Gets together: Not on file     Attends Jehovah's witness service: Not on file     Active member of club or organization: Not on file     Attends meetings of clubs or organizations: Not on file     Relationship status: Not on file   Other Topics Concern    Not on file   Social History Narrative     since 2003    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  1     No current facility-administered medications for this visit.        Review of patient's allergies indicates:  No Known Allergies      Review of Systems  Review of Systems   Constitutional: Negative for activity change, appetite change, chills, fatigue, fever and unexpected weight change.   HENT: Negative for mouth sores.    Respiratory: Negative for cough, shortness of breath and wheezing.    Cardiovascular: Negative for chest pain and palpitations.   Gastrointestinal: Negative for abdominal pain, bloating, blood in stool, constipation, nausea and vomiting.   Endocrine: Negative for diabetes and hot flashes.   Genitourinary: Positive for menorrhagia and menstrual problem. Negative for dysmenorrhea, dyspareunia, dysuria, frequency, hematuria, pelvic pain, urgency, vaginal bleeding, vaginal discharge, vaginal pain, urinary incontinence, postcoital bleeding and vaginal odor.   Musculoskeletal: Negative for back pain and myalgias.   Integumentary:  Negative for rash, breast mass and nipple discharge.   Neurological: Negative for seizures and headaches.   Psychiatric/Behavioral: Negative for depression and sleep disturbance. The patient is not nervous/anxious.    Breast:  Negative for mass, mastodynia and nipple discharge          Objective:    Physical Exam:   Constitutional: She appears well-developed and well-nourished. No distress.    HENT:   Head: Normocephalic and atraumatic.    Eyes: EOM are normal.    Neck: Normal range of motion.     Pulmonary/Chest: Effort normal. No respiratory distress.        Abdominal: Soft. She exhibits no distension. There is no tenderness. There is no rebound and no guarding.     Genitourinary: Vagina normal. No vaginal discharge found.   Genitourinary Comments: Vulva without any obvious lesions.  Urethral meatus normal size and location without any lesion.  Urethra is non-tender without stricture or discharge.  Bladder is non-tender.  Vaginal vault with good support.  Minimal white discharge noted.  No obvious lesion.  Normal rugation.  Cervix is without any cervical motion tenderness.  No obvious lesion.  Uterus is retroverted, about 6-8 weeks, non-tender, normal contour.  Adnexa is without any masses or tenderness.  Perineum without obvious lesion.             Musculoskeletal: Normal range of motion.       Neurological: She is alert.    Skin: Skin is warm and dry.    Psychiatric: She has a normal mood and affect.          Assessment:        1. Menorrhagia with irregular cycle    2.  History of gestational diabetes        Plan:      I have extensively discussed with the patient regarding her condition  GC/CT  Pelvic ultrasound.  Start oral contraceptive, Alesse.  If bleeding not improved in a couple of months, would consider hysteroscopy, D&C  Back in 2 months    Patient with history of gestational diabetes.  Will order 2hr OGTT

## 2019-10-31 ENCOUNTER — HOSPITAL ENCOUNTER (OUTPATIENT)
Dept: RADIOLOGY | Facility: HOSPITAL | Age: 34
Discharge: HOME | End: 2019-10-31
Attending: OBSTETRICS & GYNECOLOGY
Payer: MEDICAID

## 2019-10-31 DIAGNOSIS — N92.1 MENORRHAGIA WITH IRREGULAR CYCLE: ICD-10-CM

## 2019-10-31 LAB
C TRACH DNA SPEC QL NAA+PROBE: NOT DETECTED
N GONORRHOEA DNA SPEC QL NAA+PROBE: NOT DETECTED

## 2019-10-31 PROCEDURE — 76830 TRANSVAGINAL US NON-OB: CPT | Mod: 26,,, | Performed by: RADIOLOGY

## 2019-10-31 PROCEDURE — 76856 US PELVIS COMP WITH TRANSVAG NON-OB (XPD): ICD-10-PCS | Mod: 26,,, | Performed by: RADIOLOGY

## 2019-10-31 PROCEDURE — 76856 US EXAM PELVIC COMPLETE: CPT | Mod: 26,,, | Performed by: RADIOLOGY

## 2019-10-31 PROCEDURE — 76830 US PELVIS COMP WITH TRANSVAG NON-OB (XPD): ICD-10-PCS | Mod: 26,,, | Performed by: RADIOLOGY

## 2019-10-31 PROCEDURE — 76830 TRANSVAGINAL US NON-OB: CPT | Mod: TC

## 2019-11-05 ENCOUNTER — TELEPHONE (OUTPATIENT)
Dept: OBSTETRICS AND GYNECOLOGY | Facility: CLINIC | Age: 34
End: 2019-11-05

## 2019-11-05 ENCOUNTER — LAB VISIT (OUTPATIENT)
Dept: LAB | Facility: HOSPITAL | Age: 34
End: 2019-11-05
Attending: OBSTETRICS & GYNECOLOGY
Payer: MEDICAID

## 2019-11-05 DIAGNOSIS — O24.419 GESTATIONAL DIABETES MELLITUS (GDM), ANTEPARTUM, GESTATIONAL DIABETES METHOD OF CONTROL UNSPECIFIED: Primary | ICD-10-CM

## 2019-11-05 DIAGNOSIS — N92.1 MENORRHAGIA WITH IRREGULAR CYCLE: ICD-10-CM

## 2019-11-05 LAB
GLUCOSE SERPL-MCNC: 130 MG/DL
GLUCOSE SERPL-MCNC: 141 MG/DL (ref 70–110)
GLUCOSE SERPL-MCNC: 180 MG/DL

## 2019-11-05 PROCEDURE — 82951 GLUCOSE TOLERANCE TEST (GTT): CPT

## 2019-11-05 PROCEDURE — 36415 COLL VENOUS BLD VENIPUNCTURE: CPT

## 2019-11-05 NOTE — TELEPHONE ENCOUNTER
Test confirms that patient has gestational diabetes.  She would need to see a diabetic nurse educator to help you develop a diabetic diet and learn how to check blood glucose level.  We will schedule the consultation visit for her.  Orders in.

## 2020-01-07 ENCOUNTER — OFFICE VISIT (OUTPATIENT)
Dept: OBSTETRICS AND GYNECOLOGY | Facility: CLINIC | Age: 35
End: 2020-01-07
Payer: MEDICAID

## 2020-01-07 VITALS
BODY MASS INDEX: 25.34 KG/M2 | WEIGHT: 125.69 LBS | DIASTOLIC BLOOD PRESSURE: 72 MMHG | HEIGHT: 59 IN | SYSTOLIC BLOOD PRESSURE: 122 MMHG

## 2020-01-07 DIAGNOSIS — N92.1 MENORRHAGIA WITH IRREGULAR CYCLE: Primary | ICD-10-CM

## 2020-01-07 DIAGNOSIS — Z86.32 HISTORY OF GESTATIONAL DIABETES: ICD-10-CM

## 2020-01-07 PROCEDURE — 99213 OFFICE O/P EST LOW 20 MIN: CPT | Mod: S$PBB,,, | Performed by: OBSTETRICS & GYNECOLOGY

## 2020-01-07 PROCEDURE — 99999 PR PBB SHADOW E&M-EST. PATIENT-LVL III: CPT | Mod: PBBFAC,,, | Performed by: OBSTETRICS & GYNECOLOGY

## 2020-01-07 PROCEDURE — 99999 PR PBB SHADOW E&M-EST. PATIENT-LVL III: ICD-10-PCS | Mod: PBBFAC,,, | Performed by: OBSTETRICS & GYNECOLOGY

## 2020-01-07 PROCEDURE — 99213 PR OFFICE/OUTPT VISIT, EST, LEVL III, 20-29 MIN: ICD-10-PCS | Mod: S$PBB,,, | Performed by: OBSTETRICS & GYNECOLOGY

## 2020-01-07 PROCEDURE — 99213 OFFICE O/P EST LOW 20 MIN: CPT | Mod: PBBFAC | Performed by: OBSTETRICS & GYNECOLOGY

## 2020-01-07 RX ORDER — LEVONORGESTREL AND ETHINYL ESTRADIOL 0.1-0.02MG
1 KIT ORAL DAILY
Qty: 30 TABLET | Refills: 10 | Status: SHIPPED | OUTPATIENT
Start: 2020-01-07 | End: 2020-07-08 | Stop reason: CLARIF

## 2020-01-07 RX ORDER — CETIRIZINE HYDROCHLORIDE 10 MG/1
TABLET ORAL
COMMUNITY
Start: 2020-01-05 | End: 2020-07-08

## 2020-01-07 NOTE — PROGRESS NOTES
"  Subjective:       Patient ID: Jennifer Murray is a 34 y.o. female.    Chief Complaint:  Follow-up (3 months follow up medication)      History of Present Illness  HPI  Patient comes in today for follow-up  Treated for menorrhagia with OCP about 3 months ago.  Doing better.      Pelvic ultrasound on 10/31/2019 with uterus at 6.9 x 5.7 x 4.2 cm.  Endometrial polyps x 2.    Went to the lab for 2hr OGTT.  But she did not fast.  "[place] coffee with condensed milk".  Results 141/180/130.    No other complaint.      GYN & OB History  Patient's last menstrual period was 2019 (exact date).   Date of Last Pap: No result found    OB History    Para Term  AB Living   3 2 2   1 2   SAB TAB Ectopic Multiple Live Births           2      # Outcome Date GA Lbr Jean Pierre/2nd Weight Sex Delivery Anes PTL Lv   3 AB 18           2 Term 11 40w0d  4.1 kg (9 lb 0.6 oz) M  EPI N DONNA   1 Term 04 40w0d  2.9 kg (6 lb 6.3 oz) M  None N DONNA     Past Medical History:   Diagnosis Date    Diabetes in pregnancy     hemoglobin 6.4 in second trimester       History reviewed. No pertinent surgical history.    Family History   Problem Relation Age of Onset    Heart disease Mother     Hypertension Mother        Social History     Socioeconomic History    Marital status:      Spouse name: Not on file    Number of children: Not on file    Years of education: Not on file    Highest education level: Not on file   Occupational History    Not on file   Social Needs    Financial resource strain: Not on file    Food insecurity:     Worry: Not on file     Inability: Not on file    Transportation needs:     Medical: Not on file     Non-medical: Not on file   Tobacco Use    Smoking status: Never Smoker    Smokeless tobacco: Never Used   Substance and Sexual Activity    Alcohol use: No    Drug use: No    Sexual activity: Yes     Partners: Male   Lifestyle    Physical activity:     Days per week: Not on " file     Minutes per session: Not on file    Stress: Not on file   Relationships    Social connections:     Talks on phone: Not on file     Gets together: Not on file     Attends Rastafarian service: Not on file     Active member of club or organization: Not on file     Attends meetings of clubs or organizations: Not on file     Relationship status: Not on file   Other Topics Concern    Not on file   Social History Narrative     since 2003    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 30 tablet 6    ranitidine (ZANTAC) 150 MG tablet Take 150 mg by mouth 2 (two) times daily as needed.  1     No current facility-administered medications for this visit.        Review of patient's allergies indicates:  No Known Allergies    Review of Systems  Review of Systems   Constitutional: Negative for activity change, appetite change, chills, fatigue, fever and unexpected weight change.   HENT: Negative for mouth sores.    Respiratory: Negative for cough, shortness of breath and wheezing.    Cardiovascular: Negative for chest pain and palpitations.   Gastrointestinal: Negative for abdominal pain, bloating, blood in stool, constipation, nausea and vomiting.   Endocrine: Negative for diabetes and hot flashes.   Genitourinary: Positive for menstrual problem. Negative for dysmenorrhea, dyspareunia, dysuria, frequency, hematuria, menorrhagia, pelvic pain, urgency, vaginal bleeding, vaginal discharge, vaginal pain, urinary incontinence, postcoital bleeding and vaginal odor.   Musculoskeletal: Negative for back  pain and myalgias.   Integumentary:  Negative for rash, breast mass and nipple discharge.   Neurological: Negative for seizures and headaches.   Psychiatric/Behavioral: Negative for depression and sleep disturbance. The patient is not nervous/anxious.    Breast: Negative for mass, mastodynia and nipple discharge          Objective:    Physical Exam:   Constitutional: She appears well-developed and well-nourished. No distress.    HENT:   Head: Normocephalic and atraumatic.    Eyes: EOM are normal.    Neck: Normal range of motion.    Cardiovascular: Normal rate.     Pulmonary/Chest: Effort normal. No respiratory distress.        Abdominal: Soft. She exhibits no distension. There is no tenderness. There is no rebound and no guarding.             Musculoskeletal: Normal range of motion.       Neurological: She is alert.    Skin: Skin is warm and dry.    Psychiatric: She has a normal mood and affect.          Assessment:        1. Menorrhagia with irregular cycle    2. History of gestational diabetes              Plan:      I have discussed with the patient regarding her condition  She is doing well with oral contraceptive  Will continue  She will monitor her bleeding.  Back if irregular again.  Would proceed with Hysteroscopy, resection of endometrial polyps.    Back next year.

## 2020-02-04 ENCOUNTER — OFFICE VISIT (OUTPATIENT)
Dept: OBSTETRICS AND GYNECOLOGY | Facility: CLINIC | Age: 35
End: 2020-02-04
Payer: MEDICAID

## 2020-02-04 VITALS
HEIGHT: 59 IN | WEIGHT: 121.94 LBS | DIASTOLIC BLOOD PRESSURE: 64 MMHG | BODY MASS INDEX: 24.58 KG/M2 | SYSTOLIC BLOOD PRESSURE: 124 MMHG

## 2020-02-04 DIAGNOSIS — Z86.32 HISTORY OF GESTATIONAL DIABETES: ICD-10-CM

## 2020-02-04 DIAGNOSIS — N92.1 MENORRHAGIA WITH IRREGULAR CYCLE: Primary | ICD-10-CM

## 2020-02-04 PROCEDURE — 99213 PR OFFICE/OUTPT VISIT, EST, LEVL III, 20-29 MIN: ICD-10-PCS | Mod: S$PBB,,, | Performed by: OBSTETRICS & GYNECOLOGY

## 2020-02-04 PROCEDURE — 99999 PR PBB SHADOW E&M-EST. PATIENT-LVL III: CPT | Mod: PBBFAC,,, | Performed by: OBSTETRICS & GYNECOLOGY

## 2020-02-04 PROCEDURE — 99999 PR PBB SHADOW E&M-EST. PATIENT-LVL III: ICD-10-PCS | Mod: PBBFAC,,, | Performed by: OBSTETRICS & GYNECOLOGY

## 2020-02-04 PROCEDURE — 99213 OFFICE O/P EST LOW 20 MIN: CPT | Mod: PBBFAC | Performed by: OBSTETRICS & GYNECOLOGY

## 2020-02-04 PROCEDURE — 99213 OFFICE O/P EST LOW 20 MIN: CPT | Mod: S$PBB,,, | Performed by: OBSTETRICS & GYNECOLOGY

## 2020-02-04 NOTE — PROGRESS NOTES
Subjective:       Patient ID: Jennifer Murray is a 34 y.o. female.    Chief Complaint:  Follow-up (follow up irregular menses)      History of Present Illness  HPI  Patient comes in today for followup  Has been spotting.  Seen on 2020.  Placed on oral contraceptive.  Only spotted when she forgot her pills.  Otherwise, she is doing well.    Previous pelvic ultrasound with possible endometrial polyps x 2    No other complaint.  Just worried that she might have cancer.      GYN & OB History  Patient's last menstrual period was 2020 (exact date).   Date of Last Pap: No result found    OB History    Para Term  AB Living   3 2 2   1 2   SAB TAB Ectopic Multiple Live Births           2      # Outcome Date GA Lbr Jean Pierre/2nd Weight Sex Delivery Anes PTL Lv   3 AB 18           2 Term 11 40w0d  4.1 kg (9 lb 0.6 oz) M  EPI N DONNA   1 Term 04 40w0d  2.9 kg (6 lb 6.3 oz) M  None N DONNA     Past Medical History:   Diagnosis Date    Diabetes in pregnancy     hemoglobin 6.4 in second trimester       History reviewed. No pertinent surgical history.    Family History   Problem Relation Age of Onset    Heart disease Mother     Hypertension Mother        Social History     Socioeconomic History    Marital status:      Spouse name: Not on file    Number of children: Not on file    Years of education: Not on file    Highest education level: Not on file   Occupational History    Not on file   Social Needs    Financial resource strain: Not on file    Food insecurity:     Worry: Not on file     Inability: Not on file    Transportation needs:     Medical: Not on file     Non-medical: Not on file   Tobacco Use    Smoking status: Never Smoker    Smokeless tobacco: Never Used   Substance and Sexual Activity    Alcohol use: No    Drug use: No    Sexual activity: Yes     Partners: Male   Lifestyle    Physical activity:     Days per week: Not on file     Minutes per session: Not on file     Stress: Not on file   Relationships    Social connections:     Talks on phone: Not on file     Gets together: Not on file     Attends [employer] service: Not on file     Active member of club or organization: Not on file     Attends meetings of clubs or organizations: Not on file     Relationship status: Not on file   Other Topics Concern    Not on file   Social History Narrative     since 2003    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 30 tablet 10    ranitidine (ZANTAC) 150 MG tablet Take 150 mg by mouth 2 (two) times daily as needed.  1     No current facility-administered medications for this visit.        Review of patient's allergies indicates:  No Known Allergies    Review of Systems  Review of Systems   Constitutional: Negative for activity change, appetite change, chills, fatigue, fever and unexpected weight change.   HENT: Negative for mouth sores.    Respiratory: Negative for cough, shortness of breath and wheezing.    Cardiovascular: Negative for chest pain and palpitations.   Gastrointestinal: Negative for abdominal pain, bloating, blood in stool, constipation, nausea and vomiting.   Endocrine: Negative for diabetes and hot flashes.   Genitourinary: Positive for vaginal bleeding. Negative for dysmenorrhea, dyspareunia, dysuria, frequency, hematuria, menorrhagia, menstrual problem, pelvic pain, urgency, vaginal discharge, vaginal pain, urinary incontinence, postcoital bleeding and vaginal odor.   Musculoskeletal: Negative for back pain and myalgias.   Integumentary:   Negative for rash, breast mass and nipple discharge.   Neurological: Negative for seizures and headaches.   Psychiatric/Behavioral: Negative for depression and sleep disturbance. The patient is not nervous/anxious.    Breast: Negative for mass, mastodynia and nipple discharge          Objective:    Physical Exam:   Constitutional: She appears well-developed and well-nourished. No distress.    HENT:   Head: Normocephalic and atraumatic.    Eyes: EOM are normal.    Neck: Normal range of motion.    Cardiovascular: Normal rate.     Pulmonary/Chest: Effort normal. No respiratory distress.        Abdominal: Soft. She exhibits no distension. There is no tenderness. There is no rebound and no guarding.     Genitourinary:   Genitourinary Comments: Deferred             Musculoskeletal: Normal range of motion.       Neurological: She is alert.    Skin: Skin is warm and dry.    Psychiatric: She has a normal mood and affect.          Assessment:        1. Menorrhagia with irregular cycle    2. History of gestational diabetes              Plan:      I have discussed with the patient regarding her condition.  Stress compliance  She should try to take oral contraceptive regularly  If still spotting/bleeding, we would need hysteroscopy, D&C, possible MyoSure removal of endometrial polyps.  Otherwise, back next year.

## 2020-03-11 ENCOUNTER — TELEPHONE (OUTPATIENT)
Dept: PODIATRY | Facility: CLINIC | Age: 35
End: 2020-03-11

## 2020-03-11 NOTE — TELEPHONE ENCOUNTER
Called and spoke with patient she state she do not need to see podiatries. I communicated understanding.

## 2020-05-04 ENCOUNTER — OFFICE VISIT (OUTPATIENT)
Dept: OBSTETRICS AND GYNECOLOGY | Facility: CLINIC | Age: 35
End: 2020-05-04
Payer: MEDICAID

## 2020-05-04 VITALS
SYSTOLIC BLOOD PRESSURE: 128 MMHG | BODY MASS INDEX: 25.38 KG/M2 | WEIGHT: 125.88 LBS | HEIGHT: 59 IN | DIASTOLIC BLOOD PRESSURE: 76 MMHG

## 2020-05-04 DIAGNOSIS — N84.0 ENDOMETRIAL POLYP: ICD-10-CM

## 2020-05-04 DIAGNOSIS — N92.1 MENORRHAGIA WITH IRREGULAR CYCLE: Primary | ICD-10-CM

## 2020-05-04 PROCEDURE — 99213 OFFICE O/P EST LOW 20 MIN: CPT | Mod: PBBFAC | Performed by: OBSTETRICS & GYNECOLOGY

## 2020-05-04 PROCEDURE — 99999 PR PBB SHADOW E&M-EST. PATIENT-LVL III: CPT | Mod: PBBFAC,,, | Performed by: OBSTETRICS & GYNECOLOGY

## 2020-05-04 PROCEDURE — 99999 PR PBB SHADOW E&M-EST. PATIENT-LVL III: ICD-10-PCS | Mod: PBBFAC,,, | Performed by: OBSTETRICS & GYNECOLOGY

## 2020-05-04 PROCEDURE — 99213 PR OFFICE/OUTPT VISIT, EST, LEVL III, 20-29 MIN: ICD-10-PCS | Mod: S$PBB,,, | Performed by: OBSTETRICS & GYNECOLOGY

## 2020-05-04 PROCEDURE — 99213 OFFICE O/P EST LOW 20 MIN: CPT | Mod: S$PBB,,, | Performed by: OBSTETRICS & GYNECOLOGY

## 2020-05-04 RX ORDER — POLYETHYLENE GLYCOL 3350 17 G/17G
POWDER, FOR SOLUTION ORAL
COMMUNITY
Start: 2020-02-04 | End: 2020-07-08

## 2020-05-04 RX ORDER — NAPROXEN 500 MG/1
TABLET ORAL
COMMUNITY
Start: 2020-02-04 | End: 2020-07-08

## 2020-05-04 RX ORDER — ERGOCALCIFEROL 1.25 MG/1
CAPSULE ORAL
COMMUNITY
Start: 2020-02-04 | End: 2020-07-08

## 2020-05-04 NOTE — PROGRESS NOTES
Subjective:       Patient ID: Jennifer Murray is a 34 y.o. female.    Chief Complaint:  Menstrual Problem (Irregular menses- OCP not taken consistently)      History of Present Illness  HPI  Patient comes in today again complaining of having irregular vaginal bleeding  Had a Mirena.  Thought that the device.  Status post Mirena removal on 2019  Has been on OCP for the past several months.  Not consistent.  Sometimes some days, several hours late.    Pelvic ultrasound on 10/31/2019 with uterus at 6.9 x 5.7 x 4.2 cm.  Endometrial thickness at 6 mm.  Two polyps visible.     GYN & OB History  Patient's last menstrual period was 2020 (exact date).   Date of Last Pap: No result found    OB History    Para Term  AB Living   3 2 2   1 2   SAB TAB Ectopic Multiple Live Births           2      # Outcome Date GA Lbr Jean Pierre/2nd Weight Sex Delivery Anes PTL Lv   3 AB 18           2 Term 11 40w0d  4.1 kg (9 lb 0.6 oz) M  EPI N DONNA   1 Term 04 40w0d  2.9 kg (6 lb 6.3 oz) M  None N DONNA     Past Medical History:   Diagnosis Date    Diabetes in pregnancy     hemoglobin 6.4 in second trimester       History reviewed. No pertinent surgical history.    Family History   Problem Relation Age of Onset    Heart disease Mother     Hypertension Mother        Social History     Socioeconomic History    Marital status:      Spouse name: Not on file    Number of children: Not on file    Years of education: Not on file    Highest education level: Not on file   Occupational History    Not on file   Social Needs    Financial resource strain: Not on file    Food insecurity:     Worry: Not on file     Inability: Not on file    Transportation needs:     Medical: Not on file     Non-medical: Not on file   Tobacco Use    Smoking status: Never Smoker    Smokeless tobacco: Never Used   Substance and Sexual Activity    Alcohol use: No    Drug use: No    Sexual activity: Yes     Partners: Male    Lifestyle    Physical activity:     Days per week: Not on file     Minutes per session: Not on file    Stress: Not on file   Relationships    Social connections:     Talks on phone: Not on file     Gets together: Not on file     Attends Rastafarian service: Not on file     Active member of club or organization: Not on file     Attends meetings of clubs or organizations: Not on file     Relationship status: Not on file   Other Topics Concern    Not on file   Social History Narrative     since 2003    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 30 tablet 10    naproxen (NAPROSYN) 500 MG tablet TAKE ONE TABLET TWICE DAILY (MEDICINE PAIN)      polyethylene glycol (GLYCOLAX) 17 gram/dose powder MIX 1 CAPFUL (17 GRAMS) OF POWDER INTO 8 OUNCES OF WATER AND DRINK ONCE DAILY      ranitidine (ZANTAC) 150 MG tablet Take 150 mg by mouth 2 (two) times daily as needed.  1    VITAMIN D2 1,250 mcg (50,000 unit) capsule TAKE 1 CAPSULE BY MOUTH EVERY WEEK WITH 2 tums       No current facility-administered medications for this visit.        Review of patient's allergies indicates:  No Known Allergies    Review of Systems  Review of Systems   Constitutional: Negative for activity change, appetite change, chills, fatigue, fever and unexpected weight change.   HENT: Negative for mouth sores.    Respiratory: Negative for cough, shortness of breath and wheezing.    Cardiovascular: Negative for chest pain and palpitations.   Gastrointestinal: Negative for abdominal pain, bloating, blood in stool, constipation,  nausea and vomiting.   Endocrine: Negative for diabetes and hot flashes.   Genitourinary: Positive for vaginal bleeding. Negative for dysmenorrhea, dyspareunia, dysuria, frequency, hematuria, menorrhagia, menstrual problem, pelvic pain, urgency, vaginal discharge, vaginal pain, urinary incontinence, postcoital bleeding and vaginal odor.   Musculoskeletal: Negative for back pain and myalgias.   Integumentary:  Negative for rash, breast mass and nipple discharge.   Neurological: Negative for seizures and headaches.   Psychiatric/Behavioral: Negative for depression and sleep disturbance. The patient is not nervous/anxious.    Breast: Negative for mass, mastodynia and nipple discharge          Objective:    Physical Exam:   Constitutional: She appears well-developed and well-nourished. No distress.    HENT:   Head: Normocephalic and atraumatic.    Eyes: EOM are normal.    Neck: Normal range of motion.     Pulmonary/Chest: Effort normal. No respiratory distress.        Abdominal: Soft. She exhibits no distension. There is no tenderness. There is no rebound and no guarding.     Genitourinary: Uterus normal. Vaginal discharge found.   Genitourinary Comments: Vulva without any obvious lesions.  Urethral meatus normal size and location without any lesion.  Urethra is non-tender without stricture or discharge.  Bladder is non-tender.  Vaginal vault with good support.  Minimal bloody discharge noted.  No obvious lesion.  Normal rugation.  Cervix is without any cervical motion tenderness.  No obvious lesion.  Supracervical bleeding visible.  Very slow.  Uterus is small, non-tender, normal contour.  Adnexa is without any masses or tenderness.  Perineum without obvious lesion.             Musculoskeletal: Normal range of motion.       Neurological: She is alert.    Skin: Skin is warm and dry.    Psychiatric: She has a normal mood and affect.          Assessment:        1. Menorrhagia with irregular cycle    2. Endometrial polyp               Plan:      I have again discussed with the patient regarding her condition.  She would need to be more consistent with taking her OCPs    We discussed endometrial polyps.  OCP is not the treatment for polyps.  She would need hysteroscopy, curettage/MyoSure to perform polypectomy  Procedures explained.  Risks and benefits discussed  Questions answered.    We will schedule surgery  She will be back for preop.

## 2020-06-05 ENCOUNTER — TELEPHONE (OUTPATIENT)
Dept: OBSTETRICS AND GYNECOLOGY | Facility: CLINIC | Age: 35
End: 2020-06-05

## 2020-06-05 DIAGNOSIS — N92.1 MENORRHAGIA WITH IRREGULAR CYCLE: ICD-10-CM

## 2020-06-05 DIAGNOSIS — N84.0 ENDOMETRIAL POLYP: Primary | ICD-10-CM

## 2020-06-05 NOTE — TELEPHONE ENCOUNTER
Called and informed pt of her surgery date and appt for pre-op. Pt agrees with date. New check in process was also explained to pt. stella

## 2020-07-08 ENCOUNTER — HOSPITAL ENCOUNTER (OUTPATIENT)
Dept: PREADMISSION TESTING | Facility: HOSPITAL | Age: 35
Discharge: HOME | End: 2020-07-08
Attending: OBSTETRICS & GYNECOLOGY
Payer: MEDICAID

## 2020-07-08 ENCOUNTER — OFFICE VISIT (OUTPATIENT)
Dept: OBSTETRICS AND GYNECOLOGY | Facility: CLINIC | Age: 35
End: 2020-07-08
Payer: MEDICAID

## 2020-07-08 VITALS
HEIGHT: 59 IN | SYSTOLIC BLOOD PRESSURE: 110 MMHG | TEMPERATURE: 98 F | WEIGHT: 128.75 LBS | BODY MASS INDEX: 25.96 KG/M2 | DIASTOLIC BLOOD PRESSURE: 62 MMHG

## 2020-07-08 VITALS — BODY MASS INDEX: 25.96 KG/M2 | HEIGHT: 59 IN | WEIGHT: 128.75 LBS

## 2020-07-08 DIAGNOSIS — Z01.818 PREOP EXAMINATION: ICD-10-CM

## 2020-07-08 DIAGNOSIS — N84.0 ENDOMETRIAL POLYP: ICD-10-CM

## 2020-07-08 DIAGNOSIS — N92.1 MENORRHAGIA WITH IRREGULAR CYCLE: ICD-10-CM

## 2020-07-08 DIAGNOSIS — N84.0 ENDOMETRIAL POLYP: Primary | ICD-10-CM

## 2020-07-08 LAB
BACTERIA #/AREA URNS HPF: ABNORMAL /HPF
BASOPHILS # BLD AUTO: 0.05 K/UL (ref 0–0.2)
BASOPHILS NFR BLD: 0.5 % (ref 0–1.9)
BILIRUB UR QL STRIP: NEGATIVE
CLARITY UR: CLEAR
COLOR UR: YELLOW
DIFFERENTIAL METHOD: NORMAL
EOSINOPHIL # BLD AUTO: 0.1 K/UL (ref 0–0.5)
EOSINOPHIL NFR BLD: 0.9 % (ref 0–8)
ERYTHROCYTE [DISTWIDTH] IN BLOOD BY AUTOMATED COUNT: 12.8 % (ref 11.5–14.5)
GLUCOSE UR QL STRIP: NEGATIVE
HCT VFR BLD AUTO: 38.8 % (ref 37–48.5)
HGB BLD-MCNC: 12.9 G/DL (ref 12–16)
HGB UR QL STRIP: ABNORMAL
HYALINE CASTS #/AREA URNS LPF: 0 /LPF
IMM GRANULOCYTES # BLD AUTO: 0.03 K/UL (ref 0–0.04)
IMM GRANULOCYTES NFR BLD AUTO: 0.3 % (ref 0–0.5)
KETONES UR QL STRIP: NEGATIVE
LEUKOCYTE ESTERASE UR QL STRIP: ABNORMAL
LYMPHOCYTES # BLD AUTO: 2.5 K/UL (ref 1–4.8)
LYMPHOCYTES NFR BLD: 25.7 % (ref 18–48)
MCH RBC QN AUTO: 29.7 PG (ref 27–31)
MCHC RBC AUTO-ENTMCNC: 33.2 G/DL (ref 32–36)
MCV RBC AUTO: 89 FL (ref 82–98)
MICROSCOPIC COMMENT: ABNORMAL
MONOCYTES # BLD AUTO: 0.6 K/UL (ref 0.3–1)
MONOCYTES NFR BLD: 6.7 % (ref 4–15)
NEUTROPHILS # BLD AUTO: 6.3 K/UL (ref 1.8–7.7)
NEUTROPHILS NFR BLD: 65.9 % (ref 38–73)
NITRITE UR QL STRIP: NEGATIVE
NRBC BLD-RTO: 0 /100 WBC
PH UR STRIP: 5 [PH] (ref 5–8)
PLATELET # BLD AUTO: 230 K/UL (ref 150–350)
PMV BLD AUTO: 12 FL (ref 9.2–12.9)
PROT UR QL STRIP: ABNORMAL
RBC # BLD AUTO: 4.35 M/UL (ref 4–5.4)
RBC #/AREA URNS HPF: >100 /HPF (ref 0–4)
SP GR UR STRIP: 1.01 (ref 1–1.03)
URN SPEC COLLECT METH UR: ABNORMAL
UROBILINOGEN UR STRIP-ACNC: NEGATIVE EU/DL
WBC # BLD AUTO: 9.61 K/UL (ref 3.9–12.7)
WBC #/AREA URNS HPF: 2 /HPF (ref 0–5)

## 2020-07-08 PROCEDURE — 99024 PR POST-OP FOLLOW-UP VISIT: ICD-10-PCS | Mod: S$PBB,,, | Performed by: OBSTETRICS & GYNECOLOGY

## 2020-07-08 PROCEDURE — 85025 COMPLETE CBC W/AUTO DIFF WBC: CPT

## 2020-07-08 PROCEDURE — 36415 COLL VENOUS BLD VENIPUNCTURE: CPT

## 2020-07-08 PROCEDURE — 99999 PR PBB SHADOW E&M-EST. PATIENT-LVL III: CPT | Mod: PBBFAC,,, | Performed by: OBSTETRICS & GYNECOLOGY

## 2020-07-08 PROCEDURE — 99024 POSTOP FOLLOW-UP VISIT: CPT | Mod: S$PBB,,, | Performed by: OBSTETRICS & GYNECOLOGY

## 2020-07-08 PROCEDURE — 99213 OFFICE O/P EST LOW 20 MIN: CPT | Mod: PBBFAC | Performed by: OBSTETRICS & GYNECOLOGY

## 2020-07-08 PROCEDURE — 99999 PR PBB SHADOW E&M-EST. PATIENT-LVL III: ICD-10-PCS | Mod: PBBFAC,,, | Performed by: OBSTETRICS & GYNECOLOGY

## 2020-07-08 PROCEDURE — 81000 URINALYSIS NONAUTO W/SCOPE: CPT

## 2020-07-08 RX ORDER — KETOCONAZOLE 20 MG/ML
SHAMPOO, SUSPENSION TOPICAL
COMMUNITY
Start: 2020-07-04 | End: 2023-11-01 | Stop reason: SDUPTHER

## 2020-07-08 RX ORDER — MOMETASONE FUROATE 1 MG/ML
SOLUTION TOPICAL
COMMUNITY
Start: 2020-06-19 | End: 2020-07-08 | Stop reason: CLARIF

## 2020-07-08 RX ORDER — SODIUM CHLORIDE 9 MG/ML
INJECTION, SOLUTION INTRAVENOUS CONTINUOUS
Status: CANCELLED | OUTPATIENT
Start: 2020-07-08

## 2020-07-08 RX ORDER — MUPIROCIN 20 MG/G
OINTMENT TOPICAL
Status: CANCELLED | OUTPATIENT
Start: 2020-07-08

## 2020-07-08 RX ORDER — MISOPROSTOL 200 UG/1
200 TABLET ORAL ONCE
Qty: 1 TABLET | Refills: 0 | Status: SHIPPED | OUTPATIENT
Start: 2020-07-08 | End: 2020-07-30

## 2020-07-08 NOTE — PROGRESS NOTES
Subjective:       Patient ID: Jennifer Murray is a 34 y.o. female.    Chief Complaint:  Pre-op Exam (pre-op for Hscope Myosure)      History of Present Illness  HPI  Patient comes in today for preoperative consultation and examination, again complaining of having irregular vaginal bleeding  Had a Mirena.  Thought that the device.  Status post Mirena removal on 2019  Has been on OCP for several months.  Still with bleeding/spotting.  Not consistent.  Sometimes some days, several hours late.     Pelvic ultrasound on 10/31/2019 with uterus at 6.9 x 5.7 x 4.2 cm.  Endometrial thickness at 6 mm.  Two polyps visible.     She was then scheduled for hysteroscopy, resection of endometrial polyps.      GYN & OB History  Patient's last menstrual period was 2020 (exact date).   Date of Last Pap: No result found    OB History    Para Term  AB Living   3 2 2   1 2   SAB TAB Ectopic Multiple Live Births           2      # Outcome Date GA Lbr Jean Pierre/2nd Weight Sex Delivery Anes PTL Lv   3 AB 18           2 Term 11 40w0d  4.1 kg (9 lb 0.6 oz) M  EPI N DONNA   1 Term 04 40w0d  2.9 kg (6 lb 6.3 oz) M  None N DONNA     Past Medical History:   Diagnosis Date    Diabetes in pregnancy     hemoglobin 6.4 in second trimester       History reviewed. No pertinent surgical history.    Family History   Problem Relation Age of Onset    Heart disease Mother     Hypertension Mother        Social History     Socioeconomic History    Marital status:      Spouse name: Not on file    Number of children: Not on file    Years of education: Not on file    Highest education level: Not on file   Occupational History    Not on file   Social Needs    Financial resource strain: Not on file    Food insecurity     Worry: Not on file     Inability: Not on file    Transportation needs     Medical: Not on file     Non-medical: Not on file   Tobacco Use    Smoking status: Never Smoker    Smokeless tobacco:  Never Used   Substance and Sexual Activity    Alcohol use: No    Drug use: No    Sexual activity: Yes     Partners: Male   Lifestyle    Physical activity     Days per week: Not on file     Minutes per session: Not on file    Stress: Not on file   Relationships    Social connections     Talks on phone: Not on file     Gets together: Not on file     Attends [employer] service: Not on file     Active member of club or organization: Not on file     Attends meetings of clubs or organizations: Not on file     Relationship status: Not on file   Other Topics Concern    Not on file   Social History Narrative     since 2003    He is working offshore    She is a manicurist       Current Outpatient Medications   Medication Sig Dispense Refill    FLUZONE QUAD 4561-8756, PF, 60 mcg (15 mcg x 4)/0.5 mL Syrg PHARMACIST ADMINISTERED IMMUNIZATION ADMINISTERED AT TIME OF DISPENSING      ketoconazole (NIZORAL) 2 % shampoo SHAMPOO TOPICALLY TO AFFECTED AREA ONCE DAILY. LATHER LEAVE IN PLACE FOR 5 MINUTES AND THEN RINSE OFF WITH WATER.      levonorgestrel-ethinyl estradiol (AVIANE,ALESSE,LESSINA) 0.1-20 mg-mcg per tablet Take 1 tablet by mouth once daily. 30 tablet 10    mometasone (ELOCON) 0.1 % solution APPLY A FEW DROPS TO THE AFFECTED AREA(S) BY TOPICAL ROUTE ONCE DAILY       No current facility-administered medications for this visit.        Review of patient's allergies indicates:  No Known Allergies    Review of Systems  Review of Systems   Constitutional: Negative for activity change, appetite change, chills, fatigue, fever and unexpected weight change.   HENT: Negative for mouth sores.    Respiratory: Negative for cough, shortness of breath and wheezing.    Cardiovascular: Negative for chest pain and palpitations.   Gastrointestinal: Negative for abdominal pain, bloating, blood in stool, constipation, nausea and vomiting.   Endocrine: Negative for diabetes and hot flashes.   Genitourinary: Positive for vaginal  bleeding. Negative for dysmenorrhea, dyspareunia, dysuria, frequency, hematuria, menorrhagia, menstrual problem, pelvic pain, urgency, vaginal discharge, vaginal pain, urinary incontinence, postcoital bleeding and vaginal odor.   Musculoskeletal: Negative for back pain and myalgias.   Integumentary:  Negative for rash, breast mass and nipple discharge.   Neurological: Negative for seizures and headaches.   Psychiatric/Behavioral: Negative for depression and sleep disturbance. The patient is not nervous/anxious.    Breast: Negative for mass, mastodynia and nipple discharge          Objective:    Physical Exam:   Constitutional: She appears well-developed and well-nourished. No distress.    HENT:   Head: Normocephalic and atraumatic.    Eyes: EOM are normal.    Neck: Normal range of motion.    Cardiovascular: Normal rate, regular rhythm and normal heart sounds.     Pulmonary/Chest: Effort normal and breath sounds normal. No respiratory distress.        Abdominal: Soft. She exhibits no distension. There is no abdominal tenderness. There is no rebound and no guarding.     Genitourinary:    Uterus normal.      Genitourinary Comments: Vulva without any obvious lesions.  Urethral meatus normal size and location without any lesion.  Urethra is non-tender without stricture or discharge.  Bladder is non-tender.  Vaginal vault with good support.  Minimal bloody discharge noted.  No obvious lesion.  Normal rugation.  Cervix is without any cervical motion tenderness.  No obvious lesion.  Supracervical bleeding visible.  Very slow.  Uterus is small, non-tender, normal contour.  Adnexa is without any masses or tenderness.  Perineum without obvious lesion.               Musculoskeletal: Normal range of motion.       Neurological: She is alert.    Skin: Skin is warm and dry.    Psychiatric: She has a normal mood and affect.          Assessment:        1. Endometrial polyp    2. Menorrhagia with irregular cycle              Plan:       I have again extensively discussed with the patient her condition.  The proposed procedures of hysteroscopy, resection of endometrial polyps with MyoSure explained to and again extensively discussed with the patient.  Questions answered.  Consents signed.  Orders written.   Patient will go over to the hospital for preoperative care prior to the day of admission.  She will undergo surgery on Wednesday, 7/15/2020 at 1000.

## 2020-07-08 NOTE — H&P (VIEW-ONLY)
Subjective:       Patient ID: Jennifer Murray is a 34 y.o. female.    Chief Complaint:  Pre-op Exam (pre-op for Hscope Myosure)      History of Present Illness  HPI  Patient comes in today for preoperative consultation and examination, again complaining of having irregular vaginal bleeding  Had a Mirena.  Thought that the device.  Status post Mirena removal on 2019  Has been on OCP for several months.  Still with bleeding/spotting.  Not consistent.  Sometimes some days, several hours late.     Pelvic ultrasound on 10/31/2019 with uterus at 6.9 x 5.7 x 4.2 cm.  Endometrial thickness at 6 mm.  Two polyps visible.     She was then scheduled for hysteroscopy, resection of endometrial polyps.      GYN & OB History  Patient's last menstrual period was 2020 (exact date).   Date of Last Pap: No result found    OB History    Para Term  AB Living   3 2 2   1 2   SAB TAB Ectopic Multiple Live Births           2      # Outcome Date GA Lbr Jean Pierre/2nd Weight Sex Delivery Anes PTL Lv   3 AB 18           2 Term 11 40w0d  4.1 kg (9 lb 0.6 oz) M  EPI N DONNA   1 Term 04 40w0d  2.9 kg (6 lb 6.3 oz) M  None N DONNA     Past Medical History:   Diagnosis Date    Diabetes in pregnancy     hemoglobin 6.4 in second trimester       History reviewed. No pertinent surgical history.    Family History   Problem Relation Age of Onset    Heart disease Mother     Hypertension Mother        Social History     Socioeconomic History    Marital status:      Spouse name: Not on file    Number of children: Not on file    Years of education: Not on file    Highest education level: Not on file   Occupational History    Not on file   Social Needs    Financial resource strain: Not on file    Food insecurity     Worry: Not on file     Inability: Not on file    Transportation needs     Medical: Not on file     Non-medical: Not on file   Tobacco Use    Smoking status: Never Smoker    Smokeless tobacco:  Never Used   Substance and Sexual Activity    Alcohol use: No    Drug use: No    Sexual activity: Yes     Partners: Male   Lifestyle    Physical activity     Days per week: Not on file     Minutes per session: Not on file    Stress: Not on file   Relationships    Social connections     Talks on phone: Not on file     Gets together: Not on file     Attends [employer] service: Not on file     Active member of club or organization: Not on file     Attends meetings of clubs or organizations: Not on file     Relationship status: Not on file   Other Topics Concern    Not on file   Social History Narrative     since 2003    He is working offshore    She is a manicurist       Current Outpatient Medications   Medication Sig Dispense Refill    FLUZONE QUAD [id number], PF, 60 mcg (15 mcg x 4)/0.5 mL Syrg PHARMACIST ADMINISTERED IMMUNIZATION ADMINISTERED AT TIME OF DISPENSING      ketoconazole (NIZORAL) 2 % shampoo SHAMPOO TOPICALLY TO AFFECTED AREA ONCE DAILY. LATHER LEAVE IN PLACE FOR 5 MINUTES AND THEN RINSE OFF WITH WATER.      levonorgestrel-ethinyl estradiol (AVIANE,ALESSE,LESSINA) 0.1-20 mg-mcg per tablet Take 1 tablet by mouth once daily. 30 tablet 10    mometasone (ELOCON) 0.1 % solution APPLY A FEW DROPS TO THE AFFECTED AREA(S) BY TOPICAL ROUTE ONCE DAILY       No current facility-administered medications for this visit.        Review of patient's allergies indicates:  No Known Allergies    Review of Systems  Review of Systems   Constitutional: Negative for activity change, appetite change, chills, fatigue, fever and unexpected weight change.   HENT: Negative for mouth sores.    Respiratory: Negative for cough, shortness of breath and wheezing.    Cardiovascular: Negative for chest pain and palpitations.   Gastrointestinal: Negative for abdominal pain, bloating, blood in stool, constipation, nausea and vomiting.   Endocrine: Negative for diabetes and hot flashes.   Genitourinary: Positive for vaginal  bleeding. Negative for dysmenorrhea, dyspareunia, dysuria, frequency, hematuria, menorrhagia, menstrual problem, pelvic pain, urgency, vaginal discharge, vaginal pain, urinary incontinence, postcoital bleeding and vaginal odor.   Musculoskeletal: Negative for back pain and myalgias.   Integumentary:  Negative for rash, breast mass and nipple discharge.   Neurological: Negative for seizures and headaches.   Psychiatric/Behavioral: Negative for depression and sleep disturbance. The patient is not nervous/anxious.    Breast: Negative for mass, mastodynia and nipple discharge          Objective:    Physical Exam:   Constitutional: She appears well-developed and well-nourished. No distress.    HENT:   Head: Normocephalic and atraumatic.    Eyes: EOM are normal.    Neck: Normal range of motion.    Cardiovascular: Normal rate, regular rhythm and normal heart sounds.     Pulmonary/Chest: Effort normal and breath sounds normal. No respiratory distress.        Abdominal: Soft. She exhibits no distension. There is no abdominal tenderness. There is no rebound and no guarding.     Genitourinary:    Uterus normal.      Genitourinary Comments: Vulva without any obvious lesions.  Urethral meatus normal size and location without any lesion.  Urethra is non-tender without stricture or discharge.  Bladder is non-tender.  Vaginal vault with good support.  Minimal bloody discharge noted.  No obvious lesion.  Normal rugation.  Cervix is without any cervical motion tenderness.  No obvious lesion.  Supracervical bleeding visible.  Very slow.  Uterus is small, non-tender, normal contour.  Adnexa is without any masses or tenderness.  Perineum without obvious lesion.               Musculoskeletal: Normal range of motion.       Neurological: She is alert.    Skin: Skin is warm and dry.    Psychiatric: She has a normal mood and affect.          Assessment:        1. Endometrial polyp    2. Menorrhagia with irregular cycle              Plan:       I have again extensively discussed with the patient her condition.  The proposed procedures of hysteroscopy, resection of endometrial polyps with MyoSure explained to and again extensively discussed with the patient.  Questions answered.  Consents signed.  Orders written.   Patient will go over to the hospital for preoperative care prior to the day of admission.  She will undergo surgery on Wednesday, 7/15/2020 at 1000.

## 2020-07-08 NOTE — DISCHARGE INSTRUCTIONS
Your procedure  is scheduled for __7/15/2020________.    Call [phone] between 2pm and 5pm on _7/14/2020______to find out your arrival time for the day of surgery.    Report to the Emergency Department on the day of your surgery.  You will be escorted to the admitting unit.    Important instructions:   Do not eat or drink after 12 midnight, including water.  It is okay to brush your teeth.  Do not have gum, candy or mints.      Stop taking Aspirin, Ibuprofen, Motrin and Aleve , Fish oil, and Vitamin E for at least 7 days before your surgery. You may use Tylenol unless otherwise instructed by your doctor.          Return to patient registration through the Emergency Room as previously on__7/13/2020 at 7:30 am_ for  Covid test.       Please shower the night before and the morning of your surgery.         Female patients may be asked for a urine specimen on the morning of the surgery.  Please check with your nurse before using the restroom.     Do not wear make- up, including mascara.     You may wear deodorant only.      Do not wear powder, body lotion or perfume/cologne.     Do not wear any jewelry or have any metal on your body.     You will be asked to remove any dentures or partials for the procedure.     Please bring any documents given to you by your doctor.     If you are going home on the same day of surgery, you must arrange for a family member or a friend to drive you home.  Public transportation is prohibited.  You will not be able to drive home if you were given anesthesia or sedation.     Wear loose fitting clothes allowing for bandages.     Please leave money and valuables home.       You may bring your cell phone.     Call the doctor if fever or illness should occur before your surgery.    Call [phone] to contact us here if needed.

## 2020-07-13 ENCOUNTER — HOSPITAL ENCOUNTER (OUTPATIENT)
Dept: PREADMISSION TESTING | Facility: HOSPITAL | Age: 35
Discharge: HOME | End: 2020-07-13
Attending: OBSTETRICS & GYNECOLOGY
Payer: MEDICAID

## 2020-07-13 DIAGNOSIS — Z01.818 PREOP EXAMINATION: ICD-10-CM

## 2020-07-13 DIAGNOSIS — N92.1 MENORRHAGIA WITH IRREGULAR CYCLE: ICD-10-CM

## 2020-07-13 DIAGNOSIS — Z01.818 PREOP TESTING: ICD-10-CM

## 2020-07-13 DIAGNOSIS — N84.0 ENDOMETRIAL POLYP: ICD-10-CM

## 2020-07-13 LAB
ABO + RH BLD: NORMAL
BLD GP AB SCN CELLS X3 SERPL QL: NORMAL
SARS-COV-2 RDRP RESP QL NAA+PROBE: NEGATIVE

## 2020-07-13 PROCEDURE — 36415 COLL VENOUS BLD VENIPUNCTURE: CPT

## 2020-07-13 PROCEDURE — 86850 RBC ANTIBODY SCREEN: CPT

## 2020-07-13 PROCEDURE — U0002 COVID-19 LAB TEST NON-CDC: HCPCS

## 2020-07-15 ENCOUNTER — ANESTHESIA (OUTPATIENT)
Dept: SURGERY | Facility: HOSPITAL | Age: 35
End: 2020-07-15
Payer: MEDICAID

## 2020-07-15 ENCOUNTER — ANESTHESIA EVENT (OUTPATIENT)
Dept: SURGERY | Facility: HOSPITAL | Age: 35
End: 2020-07-15
Payer: MEDICAID

## 2020-07-15 ENCOUNTER — HOSPITAL ENCOUNTER (OUTPATIENT)
Facility: HOSPITAL | Age: 35
Discharge: HOME | End: 2020-07-15
Attending: OBSTETRICS & GYNECOLOGY | Admitting: OBSTETRICS & GYNECOLOGY
Payer: MEDICAID

## 2020-07-15 VITALS
HEART RATE: 87 BPM | OXYGEN SATURATION: 98 % | SYSTOLIC BLOOD PRESSURE: 122 MMHG | TEMPERATURE: 98 F | BODY MASS INDEX: 26 KG/M2 | DIASTOLIC BLOOD PRESSURE: 71 MMHG | WEIGHT: 128.75 LBS | RESPIRATION RATE: 18 BRPM

## 2020-07-15 DIAGNOSIS — N84.0 ENDOMETRIAL POLYP: ICD-10-CM

## 2020-07-15 DIAGNOSIS — Z01.818 PREOP TESTING: ICD-10-CM

## 2020-07-15 DIAGNOSIS — N92.1 MENORRHAGIA WITH IRREGULAR CYCLE: ICD-10-CM

## 2020-07-15 DIAGNOSIS — Z01.818 PREOP EXAMINATION: ICD-10-CM

## 2020-07-15 DIAGNOSIS — Z98.890 STATUS POST HYSTEROSCOPIC POLYPECTOMY: Primary | ICD-10-CM

## 2020-07-15 LAB — B-HCG UR QL: NEGATIVE

## 2020-07-15 PROCEDURE — 25000003 PHARM REV CODE 250: Performed by: OBSTETRICS & GYNECOLOGY

## 2020-07-15 PROCEDURE — 63600175 PHARM REV CODE 636 W HCPCS: Performed by: ANESTHESIOLOGY

## 2020-07-15 PROCEDURE — 36000706: Performed by: OBSTETRICS & GYNECOLOGY

## 2020-07-15 PROCEDURE — 37000009 HC ANESTHESIA EA ADD 15 MINS: Performed by: OBSTETRICS & GYNECOLOGY

## 2020-07-15 PROCEDURE — 81025 URINE PREGNANCY TEST: CPT

## 2020-07-15 PROCEDURE — 71000015 HC POSTOP RECOV 1ST HR: Performed by: OBSTETRICS & GYNECOLOGY

## 2020-07-15 PROCEDURE — 88305 TISSUE EXAM BY PATHOLOGIST: CPT | Mod: 26,,, | Performed by: PATHOLOGY

## 2020-07-15 PROCEDURE — C1782 MORCELLATOR: HCPCS | Performed by: OBSTETRICS & GYNECOLOGY

## 2020-07-15 PROCEDURE — 63600175 PHARM REV CODE 636 W HCPCS: Performed by: REGISTERED NURSE

## 2020-07-15 PROCEDURE — 25000003 PHARM REV CODE 250: Performed by: REGISTERED NURSE

## 2020-07-15 PROCEDURE — 37000008 HC ANESTHESIA 1ST 15 MINUTES: Performed by: OBSTETRICS & GYNECOLOGY

## 2020-07-15 PROCEDURE — 58558 PR HYSTEROSCOPY,W/ENDO BX: ICD-10-PCS | Mod: ,,, | Performed by: OBSTETRICS & GYNECOLOGY

## 2020-07-15 PROCEDURE — 27201423 OPTIME MED/SURG SUP & DEVICES STERILE SUPPLY: Performed by: OBSTETRICS & GYNECOLOGY

## 2020-07-15 PROCEDURE — 71000033 HC RECOVERY, INTIAL HOUR: Performed by: OBSTETRICS & GYNECOLOGY

## 2020-07-15 PROCEDURE — 88305 TISSUE EXAM BY PATHOLOGIST: CPT | Performed by: PATHOLOGY

## 2020-07-15 PROCEDURE — D9220A PRA ANESTHESIA: ICD-10-PCS | Mod: CRNA,,, | Performed by: REGISTERED NURSE

## 2020-07-15 PROCEDURE — D9220A PRA ANESTHESIA: Mod: ANES,,, | Performed by: ANESTHESIOLOGY

## 2020-07-15 PROCEDURE — D9220A PRA ANESTHESIA: ICD-10-PCS | Mod: ANES,,, | Performed by: ANESTHESIOLOGY

## 2020-07-15 PROCEDURE — 36000707: Performed by: OBSTETRICS & GYNECOLOGY

## 2020-07-15 PROCEDURE — 63600175 PHARM REV CODE 636 W HCPCS: Performed by: OBSTETRICS & GYNECOLOGY

## 2020-07-15 PROCEDURE — 58558 HYSTEROSCOPY BIOPSY: CPT | Mod: ,,, | Performed by: OBSTETRICS & GYNECOLOGY

## 2020-07-15 PROCEDURE — D9220A PRA ANESTHESIA: Mod: CRNA,,, | Performed by: REGISTERED NURSE

## 2020-07-15 PROCEDURE — 88305 TISSUE EXAM BY PATHOLOGIST: ICD-10-PCS | Mod: 26,,, | Performed by: PATHOLOGY

## 2020-07-15 RX ORDER — ONDANSETRON 2 MG/ML
INJECTION INTRAMUSCULAR; INTRAVENOUS
Status: DISCONTINUED | OUTPATIENT
Start: 2020-07-15 | End: 2020-07-15

## 2020-07-15 RX ORDER — CEFAZOLIN SODIUM 2 G/50ML
2 SOLUTION INTRAVENOUS
Status: COMPLETED | OUTPATIENT
Start: 2020-07-15 | End: 2020-07-15

## 2020-07-15 RX ORDER — SODIUM CHLORIDE 9 MG/ML
INJECTION, SOLUTION INTRAVENOUS CONTINUOUS PRN
Status: DISCONTINUED | OUTPATIENT
Start: 2020-07-15 | End: 2020-07-15

## 2020-07-15 RX ORDER — MUPIROCIN 20 MG/G
OINTMENT TOPICAL
Status: DISCONTINUED | OUTPATIENT
Start: 2020-07-15 | End: 2020-07-15 | Stop reason: HOSPADM

## 2020-07-15 RX ORDER — ACETAMINOPHEN 10 MG/ML
1000 INJECTION, SOLUTION INTRAVENOUS ONCE
Status: COMPLETED | OUTPATIENT
Start: 2020-07-15 | End: 2020-07-15

## 2020-07-15 RX ORDER — ONDANSETRON 2 MG/ML
4 INJECTION INTRAMUSCULAR; INTRAVENOUS DAILY PRN
Status: DISCONTINUED | OUTPATIENT
Start: 2020-07-15 | End: 2020-07-15 | Stop reason: HOSPADM

## 2020-07-15 RX ORDER — MIDAZOLAM HYDROCHLORIDE 1 MG/ML
INJECTION, SOLUTION INTRAMUSCULAR; INTRAVENOUS
Status: DISCONTINUED | OUTPATIENT
Start: 2020-07-15 | End: 2020-07-15

## 2020-07-15 RX ORDER — SODIUM CHLORIDE 0.9 % (FLUSH) 0.9 %
10 SYRINGE (ML) INJECTION
Status: DISCONTINUED | OUTPATIENT
Start: 2020-07-15 | End: 2020-07-15 | Stop reason: HOSPADM

## 2020-07-15 RX ORDER — HYDROMORPHONE HYDROCHLORIDE 2 MG/ML
0.2 INJECTION, SOLUTION INTRAMUSCULAR; INTRAVENOUS; SUBCUTANEOUS EVERY 5 MIN PRN
Status: DISCONTINUED | OUTPATIENT
Start: 2020-07-15 | End: 2020-07-15 | Stop reason: HOSPADM

## 2020-07-15 RX ORDER — FENTANYL CITRATE 50 UG/ML
INJECTION, SOLUTION INTRAMUSCULAR; INTRAVENOUS
Status: DISCONTINUED | OUTPATIENT
Start: 2020-07-15 | End: 2020-07-15

## 2020-07-15 RX ORDER — IBUPROFEN 600 MG/1
600 TABLET ORAL EVERY 6 HOURS PRN
Qty: 40 TABLET | Refills: 1 | Status: SHIPPED | OUTPATIENT
Start: 2020-07-15 | End: 2020-07-30

## 2020-07-15 RX ORDER — IBUPROFEN 600 MG/1
600 TABLET ORAL ONCE
Status: COMPLETED | OUTPATIENT
Start: 2020-07-15 | End: 2020-07-15

## 2020-07-15 RX ORDER — LIDOCAINE HYDROCHLORIDE 20 MG/ML
INJECTION INTRAVENOUS
Status: DISCONTINUED | OUTPATIENT
Start: 2020-07-15 | End: 2020-07-15

## 2020-07-15 RX ORDER — OXYCODONE AND ACETAMINOPHEN 5; 325 MG/1; MG/1
1 TABLET ORAL EVERY 4 HOURS PRN
Qty: 12 TABLET | Refills: 0 | Status: SHIPPED | OUTPATIENT
Start: 2020-07-15 | End: 2020-07-30

## 2020-07-15 RX ORDER — PHENYLEPHRINE HYDROCHLORIDE 10 MG/ML
INJECTION INTRAVENOUS
Status: DISCONTINUED | OUTPATIENT
Start: 2020-07-15 | End: 2020-07-15

## 2020-07-15 RX ORDER — SODIUM CHLORIDE 9 MG/ML
INJECTION, SOLUTION INTRAVENOUS CONTINUOUS
Status: DISCONTINUED | OUTPATIENT
Start: 2020-07-15 | End: 2020-07-15 | Stop reason: HOSPADM

## 2020-07-15 RX ORDER — PROPOFOL 10 MG/ML
VIAL (ML) INTRAVENOUS
Status: DISCONTINUED | OUTPATIENT
Start: 2020-07-15 | End: 2020-07-15

## 2020-07-15 RX ADMIN — SODIUM CHLORIDE: 0.9 INJECTION, SOLUTION INTRAVENOUS at 09:07

## 2020-07-15 RX ADMIN — Medication 80 MG: at 11:07

## 2020-07-15 RX ADMIN — PROPOFOL 120 MG: 10 INJECTION, EMULSION INTRAVENOUS at 11:07

## 2020-07-15 RX ADMIN — ONDANSETRON 4 MG: 2 INJECTION, SOLUTION INTRAMUSCULAR; INTRAVENOUS at 11:07

## 2020-07-15 RX ADMIN — MUPIROCIN: 20 OINTMENT TOPICAL at 09:07

## 2020-07-15 RX ADMIN — PHENYLEPHRINE HYDROCHLORIDE 100 MCG: 10 INJECTION INTRAVENOUS at 11:07

## 2020-07-15 RX ADMIN — SODIUM CHLORIDE: 0.9 INJECTION, SOLUTION INTRAVENOUS at 11:07

## 2020-07-15 RX ADMIN — FENTANYL CITRATE 50 MCG: 50 INJECTION INTRAMUSCULAR; INTRAVENOUS at 11:07

## 2020-07-15 RX ADMIN — MIDAZOLAM HYDROCHLORIDE 2 MG: 1 INJECTION, SOLUTION INTRAMUSCULAR; INTRAVENOUS at 11:07

## 2020-07-15 RX ADMIN — CEFAZOLIN SODIUM 2 G: 2 SOLUTION INTRAVENOUS at 11:07

## 2020-07-15 RX ADMIN — IBUPROFEN 600 MG: 600 TABLET, FILM COATED ORAL at 01:07

## 2020-07-15 RX ADMIN — ACETAMINOPHEN 1000 MG: 10 INJECTION, SOLUTION INTRAVENOUS at 12:07

## 2020-07-15 NOTE — TRANSFER OF CARE
Anesthesia Transfer of Care Note    Patient: Thu Jocy Murray    Procedure(s) Performed: Procedure(s) (LRB):  HYSTEROSCOPY, WITH DILATION AND CURETTAGE OF UTERUS (N/A)    Patient location: PACU    Anesthesia Type: general    Transport from OR: Transported from OR on room air with adequate spontaneous ventilation    Post pain: adequate analgesia    Post assessment: no apparent anesthetic complications and tolerated procedure well    Post vital signs: stable    Level of consciousness: awake, alert and oriented    Nausea/Vomiting: no nausea/vomiting    Complications: none    Transfer of care protocol was followed      Last vitals:   Visit Vitals  BP (!) 116/58 (BP Location: Right arm, Patient Position: Lying)   Pulse 75   Temp 36.6 °C (97.9 °F) (Oral)   Resp 12   Wt 58.4 kg (128 lb 12 oz)   LMP 07/07/2020 (Exact Date)   SpO2 100%   Breastfeeding No   BMI 26.00 kg/m²

## 2020-07-15 NOTE — BRIEF OP NOTE
OPERATIVE NOTES     Date of Procedure: 7/15/2020    Preoperative Diagnoses:  1.  Endometrial polyps  2.  Menorrhagia    Postoperative Diagnoses:  1.  Endometrial polyps  2.  Menorrhagia    Procedures:  1.  Hysteroscopy  2.  Resection of endometrial polyps    Surgeon: MD Ayush  Assistant: None    Anesthesia: GETA   EBL: 50 cc    Findings:  1.  Multiple large clots and endometrial polyps visible    Complications:  None    Specimen:  1.  Endometrial polyps and scrapping to Path    History:   35 yo    History of irregular vaginal bleeding  Had a Mirena.  Thought that the device caused her to get irregular bleeding.  Status post Mirena removal on 2019  Has been on OCP for several months.  Still with bleeding/spotting.  Not consistent.  Sometimes some days, for several hours.     Pelvic ultrasound on 10/31/2019 with uterus at 6.9 x 5.7 x 4.2 cm.  Endometrial thickness at 6 mm.  Two polyps visible.      She was then scheduled for hysteroscopy, resection of endometrial polyps.     Again, procedures explained  Questions answered  She is nervous but eager to proceed.    Quincy Peacock MD

## 2020-07-15 NOTE — INTERVAL H&P NOTE
The patient has been examined and the H&P has been reviewed:    I concur with the findings and no changes have occurred since H&P was written.    Anesthesia/Surgery risks, benefits and alternative options discussed and understood by patient/family.          Active Hospital Problems    Diagnosis  POA    Endometrial polyp [N84.0]  Yes      Resolved Hospital Problems   No resolved problems to display.     35 yo    History of irregular vaginal bleeding  Had a Mirena.  Thought that the device caused her to get irregular bleeding.  Status post Mirena removal on 2019  Has been on OCP for several months.  Still with bleeding/spotting.  Not consistent.  Sometimes some days, for several hours.     Pelvic ultrasound on 10/31/2019 with uterus at 6.9 x 5.7 x 4.2 cm.  Endometrial thickness at 6 mm.  Two polyps visible.     She was then scheduled for hysteroscopy, resection of endometrial polyps.    Again, procedures explained  Questions answered  She is nervous but eager to proceed.    Quincy Peacock MD

## 2020-07-15 NOTE — ANESTHESIA PREPROCEDURE EVALUATION
07/15/2020  Jennifer Murray is a 34 y.o., female.  To undergo Procedure(s) (LRB):  HYSTEROSCOPY, WITH DILATION AND CURETTAGE OF UTERUS (N/A)     Denies CP/SOB/GERD/MI/CVA/URI symptoms.  METS > 4  NPO > 8    Past Medical History:  Past Medical History:   Diagnosis Date    Diabetes in pregnancy     hemoglobin 6.4 in second trimester       Past Surgical History:  No past surgical history on file.    Social History:  Social History     Socioeconomic History    Marital status:      Spouse name: Not on file    Number of children: Not on file    Years of education: Not on file    Highest education level: Not on file   Occupational History    Not on file   Social Needs    Financial resource strain: Not on file    Food insecurity     Worry: Not on file     Inability: Not on file    Transportation needs     Medical: Not on file     Non-medical: Not on file   Tobacco Use    Smoking status: Never Smoker    Smokeless tobacco: Never Used   Substance and Sexual Activity    Alcohol use: No    Drug use: No    Sexual activity: Yes     Partners: Male   Lifestyle    Physical activity     Days per week: Not on file     Minutes per session: Not on file    Stress: Not on file   Relationships    Social connections     Talks on phone: Not on file     Gets together: Not on file     Attends Nondenominational service: Not on file     Active member of club or organization: Not on file     Attends meetings of clubs or organizations: Not on file     Relationship status: Not on file   Other Topics Concern    Not on file   Social History Narrative     since 2003    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      No current outpatient medications on file prior to encounter.       Allergies:  Review of patient's allergies  indicates:  No Known Allergies    Active Problems:  There is no problem list on file for this patient.      Diagnostic Studies:  Results for DENIS DAVIS (MRN [id number]) as of 7/15/2020 08:01   Ref. Range 7/8/2020 15:20   WBC Latest Ref Range: 3.90 - 12.70 K/uL 9.61   RBC Latest Ref Range: 4.00 - 5.40 M/uL 4.35   Hemoglobin Latest Ref Range: 12.0 - 16.0 g/dL 12.9   Hematocrit Latest Ref Range: 37.0 - 48.5 % 38.8   MCV Latest Ref Range: 82 - 98 fL 89   MCH Latest Ref Range: 27.0 - 31.0 pg 29.7   MCHC Latest Ref Range: 32.0 - 36.0 g/dL 33.2   RDW Latest Ref Range: 11.5 - 14.5 % 12.8   Platelets Latest Ref Range: 150 - 350 K/uL 230   MPV Latest Ref Range: 9.2 - 12.9 fL 12.0   Gran% Latest Ref Range: 38.0 - 73.0 % 65.9   Gran # (ANC) Latest Ref Range: 1.8 - 7.7 K/uL 6.3   Lymph% Latest Ref Range: 18.0 - 48.0 % 25.7   Lymph # Latest Ref Range: 1.0 - 4.8 K/uL 2.5   Mono% Latest Ref Range: 4.0 - 15.0 % 6.7   Mono # Latest Ref Range: 0.3 - 1.0 K/uL 0.6   Eosinophil% Latest Ref Range: 0.0 - 8.0 % 0.9   Eos # Latest Ref Range: 0.0 - 0.5 K/uL 0.1   Basophil% Latest Ref Range: 0.0 - 1.9 % 0.5   Baso # Latest Ref Range: 0.00 - 0.20 K/uL 0.05   nRBC Latest Ref Range: 0 /100 WBC 0   Differential Method Unknown Automated   Immature Grans (Abs) Latest Ref Range: 0.00 - 0.04 K/uL 0.03   Immature Granulocytes Latest Ref Range: 0.0 - 0.5 % 0.3   Results for DENIS DAVIS (MRN [id number]) as of 7/15/2020 08:01   Ref. Range 7/13/2020 07:26   SARS-CoV-2 RNA, Amplification, Qual Latest Ref Range: Negative  Negative     24 Hour Vitals:      See Nursing Charting For Additional Vitals    Anesthesia Evaluation    I have reviewed the Patient Summary Reports.    I have reviewed the Nursing Notes.       Review of Systems  Anesthesia Hx:  No problems with previous Anesthesia   Denies Personal Hx of Anesthesia complications.   Social:  Non-Smoker, No Alcohol Use    Cardiovascular:  Cardiovascular Normal Exercise tolerance: good      Pulmonary:  Pulmonary Normal    Hepatic/GI:  Hepatic/GI Normal        Physical Exam  General:  Well nourished    Airway/Jaw/Neck:   MP2, TMD > 3FB, teeth intact     Chest/Lungs:  Chest/Lungs Clear    Heart/Vascular:  Heart Findings: Normal            Anesthesia Plan  Type of Anesthesia, risks & benefits discussed:  Anesthesia Type:  general  Patient's Preference:   Intra-op Monitoring Plan: standard ASA monitors  Intra-op Monitoring Plan Comments:   Post Op Pain Control Plan: multimodal analgesia, IV/PO Opioids PRN and per primary service following discharge from PACU  Post Op Pain Control Plan Comments:   Induction:   IV  Beta Blocker:  Patient is not currently on a Beta-Blocker (No further documentation required).       Informed Consent: Patient understands risks and agrees with Anesthesia plan.  Questions answered. Anesthesia consent signed with patient.  ASA Score: 1     Day of Surgery Review of History & Physical:  There are no significant changes.          Ready For Surgery From Anesthesia Perspective.

## 2020-07-15 NOTE — DISCHARGE INSTRUCTIONS
ACTIVITY LEVEL:  If you have received sedation or an anesthetic, you may feel sleepy for several hours. Rest until you are more awake. Gradually resume your normal activities. No driving or alcoholic beverages for 24 hours.  ? Pelvic rest- no sex, tampons or douching until follow up or instructed by doctor.  ? No driving, alcoholic beverages or signing legal documents for next 24 hours or while taking pain medication  DIET:  You may resume your home diet. If nausea is present, increase your diet gradually with fluids and bland foods.    Medications:  Pain medication should be taken only if needed and as directed. If antibiotics are prescribed, the medication should be taken until completed. You will be given an updated list of you medications.    CALL THE DOCTOR:          Shortness of breath, Coughing up Bloody Sputum or Pains or Swelling in your Calves.  Persistent pain or nausea not relieved by medication.  If vaginal bleeding is in excess of a normal period.  Problems urinating  Fever over 101.    If any unusual problems or difficulties occur contact your doctor. If you cannot contact your doctor but feel your signs and symptoms warrant a physicians attention return to the emergency room.      Fall Prevention  Millions of people fall every year and injure themselves. You may have had anesthesia or sedation which may increase your risk of falling. You may have health issues that put you at an increased risk of falling.     Here are ways to reduce your risk of falling.  ·   · Make your home safe by keeping walkways clear of objects you may trip over.  · Use non-slip pads under rugs. Do not use area rugs or small throw rugs.  · Use non-slip mats in bathtubs and showers.  · Install handrails and lights on staircases.  · Do not walk in poorly lit areas.  · Do not stand on chairs or wobbly ladders.  · Use caution when reaching overhead or looking upward. This position can cause a loss of balance.  · Be sure your  shoes fit properly, have non-slip bottoms and are in good condition.   · Wear shoes both inside and out. Avoid going barefoot or wearing slippers.  · Be cautious when going up and down stairs, curbs, and when walking on uneven sidewalks.  · If your balance is poor, consider using a cane or walker.  · If your fall was related to alcohol use, stop or limit alcohol intake.   · If your fall was related to use of sleeping medicines, talk to your doctor about this. You may need to reduce your dosage at bedtime if you awaken during the night to go to the bathroom.    · To reduce the need for nighttime bathroom trips:  ¨ Avoid drinking fluids for several hours before going to bed  ¨ Empty your bladder before going to bed  ¨ Men can keep a urinal at the bedside  · Stay as active as you can. Balance, flexibility, strength, and endurance all come from exercise. They all play a role in preventing falls. Ask your healthcare provider which types of activity are right for you.  · Get your vision checked on a regular basis.  · If you have pets, know where they are before you stand up or walk so you don't trip over them.  · Use night lights.

## 2020-07-15 NOTE — DISCHARGE SUMMARY
DISCHARGE SUMMARY    Date of Admission: 7/15/2020  Date of Discharge: 7/15/2020    Diagnoses on Discharge:   Status post hysteroscopy, resection of endometrial polyps using MyoSure device.       History:   33 yo    History of irregular vaginal bleeding  Had a Mirena.  Thought that the device caused her to get irregular bleeding.  Status post Mirena removal on 2019  Has been on OCP for several months.  Still with bleeding/spotting.  Not consistent.  Sometimes some days, for several hours.     Pelvic ultrasound on 10/31/2019 with uterus at 6.9 x 5.7 x 4.2 cm.  Endometrial thickness at 6 mm.  Two polyps visible.      She was then scheduled for hysteroscopy, resection of endometrial polyps.     Again, procedures explained  Questions answered  She is nervous but eager to proceed.      OPERATIVE NOTES      Date of Procedure: 7/15/2020     Preoperative Diagnoses:  1.  Endometrial polyps  2.  Menorrhagia     Postoperative Diagnoses:  1.  Endometrial polyps  2.  Menorrhagia     Procedures:  1.  Hysteroscopy  2.  Resection of endometrial polyps     Surgeon: MD Ayush  Assistant: None     Anesthesia: GETA   EBL: 50 cc     Findings:  1.  Multiple large clots and endometrial polyps visible     Complications:  None     Specimen:  1.  Endometrial polyps and scrapping to Path      Postoperative course was benign.  She is tolerating oral intake well.  Exam was benign with patient afebrile, vitals stable, and minimal bleeding.  Normal activities.   Regular diet  Patient discharged home on day of surgery, 7/15/2020  Discharge medications include Percocet, Motrin, prenatal vitamins, and iron supplement.  Follow-up with me in 2 weeks.    Quincy Peacock MD.

## 2020-07-16 NOTE — ANESTHESIA POSTPROCEDURE EVALUATION
Anesthesia Post Evaluation    Patient: u Jocy Murray    Procedure(s) Performed: Procedure(s) (LRB):  POLYPECTOMY, UTERUS, HYSTEROSCOPIC (N/A)    Final Anesthesia Type: general    Patient location during evaluation: PACU  Patient participation: Yes- Able to Participate  Level of consciousness: awake and alert and oriented  Post-procedure vital signs: reviewed and stable  Pain management: adequate  Airway patency: patent    PONV status at discharge: No PONV  Anesthetic complications: no      Cardiovascular status: hemodynamically stable and blood pressure returned to baseline  Respiratory status: spontaneous ventilation, room air and unassisted  Hydration status: euvolemic  Follow-up not needed.          Vitals Value Taken Time   /71 07/15/20 1345   Temp 36.7 °C (98 °F) 07/15/20 1345   Pulse 87 07/15/20 1345   Resp 18 07/15/20 1345   SpO2 98 % 07/15/20 1345         Event Time   Out of Recovery 13:00:00         Pain/Alyx Score: Pain Rating Prior to Med Admin: 3 (7/15/2020  1:55 PM)  Pain Rating Post Med Admin: 3 (7/15/2020 12:59 PM)  Alyx Score: 10 (7/15/2020  1:55 PM)  Modified Alyx Score: 20 (7/15/2020  1:55 PM)

## 2020-07-17 LAB
FINAL PATHOLOGIC DIAGNOSIS: NORMAL
GROSS: NORMAL

## 2020-07-30 ENCOUNTER — OFFICE VISIT (OUTPATIENT)
Dept: OBSTETRICS AND GYNECOLOGY | Facility: CLINIC | Age: 35
End: 2020-07-30
Payer: MEDICAID

## 2020-07-30 VITALS
HEIGHT: 59 IN | SYSTOLIC BLOOD PRESSURE: 120 MMHG | TEMPERATURE: 98 F | BODY MASS INDEX: 25.73 KG/M2 | DIASTOLIC BLOOD PRESSURE: 64 MMHG | WEIGHT: 127.63 LBS

## 2020-07-30 DIAGNOSIS — Z09 POSTOP CHECK: Primary | ICD-10-CM

## 2020-07-30 DIAGNOSIS — N84.0 ENDOMETRIAL POLYP: ICD-10-CM

## 2020-07-30 PROCEDURE — 99213 OFFICE O/P EST LOW 20 MIN: CPT | Mod: PBBFAC | Performed by: OBSTETRICS & GYNECOLOGY

## 2020-07-30 PROCEDURE — 99024 PR POST-OP FOLLOW-UP VISIT: ICD-10-PCS | Mod: ,,, | Performed by: OBSTETRICS & GYNECOLOGY

## 2020-07-30 PROCEDURE — 99999 PR PBB SHADOW E&M-EST. PATIENT-LVL III: CPT | Mod: PBBFAC,,, | Performed by: OBSTETRICS & GYNECOLOGY

## 2020-07-30 PROCEDURE — 99024 POSTOP FOLLOW-UP VISIT: CPT | Mod: ,,, | Performed by: OBSTETRICS & GYNECOLOGY

## 2020-07-30 PROCEDURE — 99999 PR PBB SHADOW E&M-EST. PATIENT-LVL III: ICD-10-PCS | Mod: PBBFAC,,, | Performed by: OBSTETRICS & GYNECOLOGY

## 2020-07-30 RX ORDER — ASPIRIN 325 MG
TABLET, DELAYED RELEASE (ENTERIC COATED) ORAL
COMMUNITY
Start: 2020-07-20 | End: 2021-09-27

## 2020-07-30 NOTE — PROGRESS NOTES
Subjective:       Patient ID: Jennifer Murray is a 34 y.o. female.    Chief Complaint:  Post-op Evaluation (2 wks PO Hscope Myosure on 07/15/2020)      History of Present Illness  HPI  Patient comes in today for follow-up  Status post hysteroscopy, MyoSure resection of endometrial polyps.    Doing well. No fever or chills.  No nausea or vomiting.  No pain.  Spotted for one day.  No pain medication.    Path report only shows endometrial polyps.    GYN & OB History  Patient's last menstrual period was 2020 (exact date).   Date of Last Pap: No result found    OB History    Para Term  AB Living   3 2 2   1 2   SAB TAB Ectopic Multiple Live Births           2      # Outcome Date GA Lbr Jean Pierre/2nd Weight Sex Delivery Anes PTL Lv   3 AB 18           2 Term 11 40w0d  4.1 kg (9 lb 0.6 oz) M  EPI N DONNA   1 Term 04 40w0d  2.9 kg (6 lb 6.3 oz) M  None N DONNA     Past Medical History:   Diagnosis Date    Diabetes in pregnancy     hemoglobin 6.4 in second trimester       Past Surgical History:   Procedure Laterality Date    HYSTEROSCOPIC POLYPECTOMY OF UTERUS N/A 7/15/2020    Procedure: POLYPECTOMY, UTERUS, HYSTEROSCOPIC;  Surgeon: Quincy Peacock MD;  Location: [facility] OR;  Service: OB/GYN;  Laterality: N/A;  MyoSure to remove endometrial polyps ERWIN LÓPEZ [phone] WILL BE HERE FOR CASE 2020  RN PREOP 2020  T/S---UPT IN AM  [facility] NEED[facility]-----COVID NEGATIVE       Family History   Problem Relation Age of Onset    Heart disease Mother     Hypertension Mother        Social History     Socioeconomic History    Marital status:      Spouse name: Not on file    Number of children: Not on file    Years of education: Not on file    Highest education level: Not on file   Occupational History    Not on file   Social Needs    Financial resource strain: Not on file    Food insecurity     Worry: Not on file     Inability: Not on file    Transportation needs     Medical: Not on  file     Non-medical: Not on file   Tobacco Use    Smoking status: Never Smoker    Smokeless tobacco: Never Used   Substance and Sexual Activity    Alcohol use: No    Drug use: No    Sexual activity: Yes     Partners: Male   Lifestyle    Physical activity     Days per week: Not on file     Minutes per session: Not on file    Stress: Not on file   Relationships    Social connections     Talks on phone: Not on file     Gets together: Not on file     Attends Christianity service: Not on file     Active member of club or organization: Not on file     Attends meetings of clubs or organizations: Not on file     Relationship status: Not on file   Other Topics Concern    Not on file   Social History Narrative     since 2003    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 LATHER LEAVE IN PLACE FOR 5 MINUTES AND THEN RINSE OFF WITH WATER.       No current facility-administered medications for this visit.        Review of patient's allergies indicates:  No Known Allergies    Review of Systems  Review of Systems   Constitutional: Negative for activity change, appetite change, chills, fatigue, fever and unexpected weight change.   HENT: Negative for mouth sores.    Respiratory: Negative for cough, shortness of breath and wheezing.    Cardiovascular: Negative for chest pain and palpitations.   Gastrointestinal: Negative for abdominal pain, bloating, blood in stool, constipation, nausea and vomiting.   Endocrine: Negative for diabetes and hot flashes.   Genitourinary: Negative for dysmenorrhea, dyspareunia, dysuria, frequency, hematuria, menorrhagia, menstrual problem, pelvic pain, urgency, vaginal bleeding, vaginal discharge, vaginal pain, urinary incontinence, postcoital bleeding and vaginal  odor.   Musculoskeletal: Negative for back pain and myalgias.   Integumentary:  Negative for rash, breast mass and nipple discharge.   Neurological: Negative for seizures and headaches.   Psychiatric/Behavioral: Negative for depression and sleep disturbance. The patient is not nervous/anxious.    Breast: Negative for mass, mastodynia and nipple discharge          Objective:    Physical Exam:   Constitutional: She appears well-developed and well-nourished. No distress.    HENT:   Head: Normocephalic and atraumatic.    Eyes: EOM are normal.    Neck: Normal range of motion.    Cardiovascular: Normal rate.     Pulmonary/Chest: Effort normal. No respiratory distress.                  Musculoskeletal: Normal range of motion.       Neurological: She is alert.    Skin: Skin is warm and dry.    Psychiatric: She has a normal mood and affect.          Assessment:        1. Postop check    2. Endometrial polyp              Plan:      I have discussed with the patient her condition.  She is doing well with respect to surgery.  She will continue to watch her vaginal bleeding and menses.  She should come back if there are any change  Back next year for follow-up and annual visit.  She can come back sooner if she needs us.

## 2020-08-10 ENCOUNTER — OFFICE VISIT (OUTPATIENT)
Dept: OBSTETRICS AND GYNECOLOGY | Facility: CLINIC | Age: 35
End: 2020-08-10
Payer: MEDICAID

## 2020-08-10 ENCOUNTER — TELEPHONE (OUTPATIENT)
Dept: OBSTETRICS AND GYNECOLOGY | Facility: CLINIC | Age: 35
End: 2020-08-10

## 2020-08-10 VITALS
WEIGHT: 127.44 LBS | TEMPERATURE: 98 F | DIASTOLIC BLOOD PRESSURE: 74 MMHG | BODY MASS INDEX: 25.69 KG/M2 | SYSTOLIC BLOOD PRESSURE: 138 MMHG | HEIGHT: 59 IN

## 2020-08-10 DIAGNOSIS — N92.0 SPOTTING: ICD-10-CM

## 2020-08-10 DIAGNOSIS — Z98.890 STATUS POST HYSTEROSCOPIC POLYPECTOMY: Primary | ICD-10-CM

## 2020-08-10 PROCEDURE — 99999 PR PBB SHADOW E&M-EST. PATIENT-LVL III: ICD-10-PCS | Mod: PBBFAC,,, | Performed by: OBSTETRICS & GYNECOLOGY

## 2020-08-10 PROCEDURE — 99213 OFFICE O/P EST LOW 20 MIN: CPT | Mod: S$PBB,,, | Performed by: OBSTETRICS & GYNECOLOGY

## 2020-08-10 PROCEDURE — 99213 PR OFFICE/OUTPT VISIT, EST, LEVL III, 20-29 MIN: ICD-10-PCS | Mod: S$PBB,,, | Performed by: OBSTETRICS & GYNECOLOGY

## 2020-08-10 PROCEDURE — 99999 PR PBB SHADOW E&M-EST. PATIENT-LVL III: CPT | Mod: PBBFAC,,, | Performed by: OBSTETRICS & GYNECOLOGY

## 2020-08-10 PROCEDURE — 99213 OFFICE O/P EST LOW 20 MIN: CPT | Mod: PBBFAC | Performed by: OBSTETRICS & GYNECOLOGY

## 2020-08-10 RX ORDER — SELENIUM SULFIDE 22.5 MG/ML
SHAMPOO TOPICAL
COMMUNITY
Start: 2020-08-09 | End: 2021-09-27

## 2020-08-10 RX ORDER — TRAZODONE HYDROCHLORIDE 100 MG/1
TABLET ORAL
COMMUNITY
Start: 2020-08-04 | End: 2021-09-27

## 2020-08-10 NOTE — TELEPHONE ENCOUNTER
Appointment given as requested.    ----- Message from Paz Smith sent at 8/10/2020  9:38 AM CDT -----  Contact: DENIS DAVIS [[id number]]  Type: Patient Call Back    Who called: DENIS DAVIS [[id number]]    What is the request in detail: Patient is requesting a call back. She states that she is still bleeding and she would like to be seen today. I offered her another provider for today and she only wants to see Dr. Peacock.   Please advise.    Can the clinic reply by [facility]? No    Best call back number: [phone]    Additional Information: N/A

## 2020-08-10 NOTE — PROGRESS NOTES
Subjective:       Patient ID: Jennifer Murray is a 34 y.o. female.    Chief Complaint:  Post-op Evaluation (3 wks 5d PO Hscope Myosure on 7/15.  Pt is bleeding today.)      History of Present Illness  HPI  Patient comes in today complaining of vaginal spotting  Status post hysteroscopic resection of endometrial polyps on 7/15/2020  Normal cycle on 2020 for about 4 days.  It stopped then started again for the past couple of days.  Denies fever or chills.  No pain.      GYN & OB History  Patient's last menstrual period was 2020 (exact date).   Date of Last Pap: No result found    OB History    Para Term  AB Living   3 2 2   1 2   SAB TAB Ectopic Multiple Live Births           2      # Outcome Date GA Lbr Jean Pierre/2nd Weight Sex Delivery Anes PTL Lv   3 AB 18           2 Term 11 40w0d  4.1 kg (9 lb 0.6 oz) M  EPI N DONNA   1 Term 04 40w0d  2.9 kg (6 lb 6.3 oz) M  None N DONNA     Past Medical History:   Diagnosis Date    Diabetes in pregnancy     hemoglobin 6.4 in second trimester       Past Surgical History:   Procedure Laterality Date    HYSTEROSCOPIC POLYPECTOMY OF UTERUS N/A 7/15/2020    Procedure: POLYPECTOMY, UTERUS, HYSTEROSCOPIC;  Surgeon: Quincy Peacock MD;  [facility];  Service: OB/GYN;  Laterality: N/A;  MyoSure to remove endometrial polyps ERWIN MARIBEL [phone] WILL BE HERE FOR CASE 2020  RN PREOP 2020  T/S---UPT IN AM  [facility] NEEDED-----COVID NEGATIVE       Family History   Problem Relation Age of Onset    Heart disease Mother     Hypertension Mother        Social History     Socioeconomic History    Marital status:      Spouse name: Not on file    Number of children: Not on file    Years of education: Not on file    Highest education level: Not on file   Occupational History    Not on file   Social Needs    Financial resource strain: Not on file    Food insecurity     Worry: Not on file     Inability: Not on file    Transportation  needs     Medical: Not on file     Non-medical: Not on file   Tobacco Use    Smoking status: Never Smoker    Smokeless tobacco: Never Used   Substance and Sexual Activity    Alcohol use: No    Drug use: No    Sexual activity: Yes     Partners: Male   Lifestyle    Physical activity     Days per week: Not on file     Minutes per session: Not on file    Stress: Not on file   Relationships    Social connections     Talks on phone: Not on file     Gets together: Not on file     Attends Gnosticism service: Not on file     Active member of club or organization: Not on file     Attends meetings of clubs or organizations: Not on file     Relationship status: Not on file   Other Topics Concern    Not on file   Social History Narrative     since 2003    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 LATHER LEAVE IN PLACE FOR 5 MINUTES AND THEN RINSE OFF WITH WATER.      selenium sulfide 2.25 % Sham       traZODone (DESYREL) 100 MG tablet        No current facility-administered medications for this visit.        Review of patient's allergies indicates:  No Known Allergies    Review of Systems  Review of Systems   Constitutional: Negative for activity change, appetite change, chills, fatigue, fever and unexpected weight change.   HENT: Negative for mouth sores.    Respiratory: Negative for cough, shortness of breath and wheezing.    Cardiovascular: Negative for chest pain and palpitations.   Gastrointestinal: Negative for abdominal pain, bloating, blood in stool, constipation, nausea and vomiting.   Endocrine: Negative for diabetes and hot flashes.   Genitourinary: Positive for vaginal bleeding. Negative for dysmenorrhea, dyspareunia, dysuria, frequency, hematuria, menorrhagia, menstrual  problem, pelvic pain, urgency, vaginal discharge, vaginal pain, urinary incontinence, postcoital bleeding and vaginal odor.   Musculoskeletal: Negative for back pain and myalgias.   Integumentary:  Negative for rash, breast mass and nipple discharge.   Neurological: Negative for seizures and headaches.   Psychiatric/Behavioral: Negative for depression and sleep disturbance. The patient is not nervous/anxious.    Breast: Negative for mass, mastodynia and nipple discharge          Objective:    Physical Exam:   Constitutional: She appears well-developed and well-nourished. No distress.    HENT:   Head: Normocephalic and atraumatic.    Eyes: EOM are normal.    Neck: Normal range of motion.     Pulmonary/Chest: Effort normal. No respiratory distress.   Breasts: Non-tender, no engorgement, no masses, no retraction, no discharge. Negative for lymphadenopathy.         Abdominal: Soft. She exhibits no distension. There is no abdominal tenderness. There is no rebound and no guarding.     Genitourinary:    Uterus normal.      Genitourinary Comments: Vulva without any obvious lesions.  Urethral meatus normal size and location without any lesion.  Urethra is non-tender without stricture or discharge.  Bladder is non-tender.  Vaginal vault with good support.  Minimal bloody brown discharge noted.  No obvious lesion.  Normal rugation.  Cervix is without any cervical motion tenderness.  No obvious lesion.  Uterus is small, non-tender, normal contour.  Adnexa is without any masses or tenderness.  Perineum without obvious lesion.     positive for vaginal discharge          Musculoskeletal: Normal range of motion.       Neurological: She is alert.    Skin: Skin is warm and dry.    Psychiatric: She has a normal mood and affect.          Assessment:        1. Status post hysteroscopic polypectomy    2. Spotting              Plan:      I have discussed with the patient regarding her condition  Most likely, her bleeding was from her last  surgery  No evidence of infection.  She will continue to watch her bleeding and let us know if it would get worse  Needs to resume oral contraceptive  Back as needed.

## 2020-08-10 NOTE — TELEPHONE ENCOUNTER
----- Message from Tiffanie Lowe sent at 8/10/2020 10:47 AM CDT -----  Name of Who is Calling: DENIS DAVIS [[id number]]    What is the request in detail: DENIS DAVIS [[id number]] has arrived at appointment Please contact to further discuss and advise      Can the clinic reply by [facility]: no     [facility]  [phone] (home)

## 2021-09-27 ENCOUNTER — OFFICE VISIT (OUTPATIENT)
Dept: OBSTETRICS AND GYNECOLOGY | Facility: CLINIC | Age: 36
End: 2021-09-27
Payer: MEDICAID

## 2021-09-27 VITALS
DIASTOLIC BLOOD PRESSURE: 82 MMHG | WEIGHT: 127.88 LBS | SYSTOLIC BLOOD PRESSURE: 140 MMHG | HEIGHT: 59 IN | BODY MASS INDEX: 25.78 KG/M2

## 2021-09-27 DIAGNOSIS — Z86.32 HISTORY OF GESTATIONAL DIABETES: ICD-10-CM

## 2021-09-27 DIAGNOSIS — N92.1 MENORRHAGIA WITH IRREGULAR CYCLE: ICD-10-CM

## 2021-09-27 DIAGNOSIS — Z01.419 WELL WOMAN EXAM WITH ROUTINE GYNECOLOGICAL EXAM: Primary | ICD-10-CM

## 2021-09-27 PROCEDURE — 99999 PR PBB SHADOW E&M-EST. PATIENT-LVL III: ICD-10-PCS | Mod: PBBFAC,,, | Performed by: OBSTETRICS & GYNECOLOGY

## 2021-09-27 PROCEDURE — 99213 OFFICE O/P EST LOW 20 MIN: CPT | Mod: PBBFAC | Performed by: OBSTETRICS & GYNECOLOGY

## 2021-09-27 PROCEDURE — 99395 PREV VISIT EST AGE 18-39: CPT | Mod: S$PBB,,, | Performed by: OBSTETRICS & GYNECOLOGY

## 2021-09-27 PROCEDURE — 99999 PR PBB SHADOW E&M-EST. PATIENT-LVL III: CPT | Mod: PBBFAC,,, | Performed by: OBSTETRICS & GYNECOLOGY

## 2021-09-27 PROCEDURE — 87624 HPV HI-RISK TYP POOLED RSLT: CPT | Performed by: OBSTETRICS & GYNECOLOGY

## 2021-09-27 PROCEDURE — 99395 PR PREVENTIVE VISIT,EST,18-39: ICD-10-PCS | Mod: S$PBB,,, | Performed by: OBSTETRICS & GYNECOLOGY

## 2021-09-27 PROCEDURE — 88175 CYTOPATH C/V AUTO FLUID REDO: CPT | Performed by: OBSTETRICS & GYNECOLOGY

## 2021-09-27 RX ORDER — TRIAMCINOLONE ACETONIDE 1 MG/G
CREAM TOPICAL 2 TIMES DAILY
COMMUNITY
Start: 2021-06-22

## 2021-09-27 RX ORDER — LEVONORGESTREL AND ETHINYL ESTRADIOL 0.1-0.02MG
1 KIT ORAL DAILY
Qty: 28 TABLET | Refills: 12 | Status: SHIPPED | OUTPATIENT
Start: 2021-09-27 | End: 2022-03-26

## 2021-09-27 RX ORDER — IBUPROFEN 600 MG/1
600 TABLET ORAL EVERY 6 HOURS PRN
COMMUNITY
Start: 2021-05-30 | End: 2023-07-05 | Stop reason: SDUPTHER

## 2021-09-27 RX ORDER — FLUTICASONE PROPIONATE 50 MCG
SPRAY, SUSPENSION (ML) NASAL 2 TIMES DAILY
COMMUNITY
Start: 2021-05-10 | End: 2023-11-01 | Stop reason: SDUPTHER

## 2021-09-27 RX ORDER — CLOBETASOL PROPIONATE 0.46 MG/ML
SOLUTION TOPICAL DAILY
COMMUNITY
Start: 2021-06-22

## 2022-03-04 ENCOUNTER — HOSPITAL ENCOUNTER (EMERGENCY)
Facility: HOSPITAL | Age: 37
Discharge: HOME | End: 2022-03-05
Attending: INTERNAL MEDICINE
Payer: MEDICAID

## 2022-03-04 DIAGNOSIS — R00.2 PALPITATIONS: ICD-10-CM

## 2022-03-04 PROCEDURE — 99283 EMERGENCY DEPT VISIT LOW MDM: CPT | Mod: 25,ER

## 2022-03-04 PROCEDURE — 93010 ELECTROCARDIOGRAM REPORT: CPT | Mod: ,,, | Performed by: INTERNAL MEDICINE

## 2022-03-04 PROCEDURE — 93005 ELECTROCARDIOGRAM TRACING: CPT | Mod: ER

## 2022-03-04 PROCEDURE — 93010 EKG 12-LEAD: ICD-10-PCS | Mod: ,,, | Performed by: INTERNAL MEDICINE

## 2022-03-04 RX ORDER — PROMETHAZINE HYDROCHLORIDE AND DEXTROMETHORPHAN HYDROBROMIDE 6.25; 15 MG/5ML; MG/5ML
SYRUP ORAL
COMMUNITY

## 2022-03-04 RX ORDER — LISINOPRIL 10 MG/1
10 TABLET ORAL DAILY
COMMUNITY
End: 2023-07-05

## 2022-03-05 VITALS
BODY MASS INDEX: 25.52 KG/M2 | SYSTOLIC BLOOD PRESSURE: 119 MMHG | TEMPERATURE: 98 F | HEART RATE: 83 BPM | DIASTOLIC BLOOD PRESSURE: 74 MMHG | OXYGEN SATURATION: 99 % | WEIGHT: 130 LBS | HEIGHT: 60 IN | RESPIRATION RATE: 18 BRPM

## 2022-03-05 PROBLEM — R00.2 PALPITATIONS: Status: ACTIVE | Noted: 2022-03-05

## 2022-03-05 LAB
B-HCG UR QL: NEGATIVE
CTP QC/QA: YES

## 2022-03-05 PROCEDURE — 81025 URINE PREGNANCY TEST: CPT | Mod: ER | Performed by: INTERNAL MEDICINE

## 2022-03-05 PROCEDURE — U0003 INFECTIOUS AGENT DETECTION BY NUCLEIC ACID (DNA OR RNA); SEVERE ACUTE RESPIRATORY SYNDROME CORONAVIRUS 2 (SARS-COV-2) (CORONAVIRUS DISEASE [COVID-19]), AMPLIFIED PROBE TECHNIQUE, MAKING USE OF HIGH THROUGHPUT TECHNOLOGIES AS DESCRIBED BY CMS-2020-01-R: HCPCS | Performed by: INTERNAL MEDICINE

## 2022-03-05 PROCEDURE — U0005 INFEC AGEN DETEC AMPLI PROBE: HCPCS | Performed by: INTERNAL MEDICINE

## 2022-03-05 NOTE — ED PROVIDER NOTES
Encounter Date: 3/4/2022    SCRIBE #1 NOTE: I, Claudio Sadler, am scribing for, and in the presence of,  Malick Ledezma MD. I have scribed the following portions of the note - Other sections scribed: HPI, ROS, PE.       History     Chief Complaint   Patient presents with    Palpitations     Pt c/o cough for past 2 weeks, was seen for it 2/22 and rx'd Promethazine DM as well as Lisinopril for her elevated bp; pt reports cough persists with increasing weakness and now having intermittent palpitations     Jennifer Murray 36 y.o. female, with no known pertinent PMHx, presents to the ED with palpitations, SOB, fatigue, and sore throat for more than a week and a headache for 3-4 days. Patient describes her headache as blood rushing from one side to another. Patient also reports she has recently seen her PCP for cough and was prescribed medication for her symptoms. Patient denies fever or other associated symptoms.       The history is provided by the patient. A  was used (Scribe was used for translation).     Review of patient's allergies indicates:  No Known Allergies  Past Medical History:   Diagnosis Date    Diabetes in pregnancy     hemoglobin 6.4 in second trimester    Hypertension      Past Surgical History:   Procedure Laterality Date    HYSTEROSCOPIC POLYPECTOMY OF UTERUS N/A 7/15/2020    Procedure: POLYPECTOMY, UTERUS, HYSTEROSCOPIC;  Surgeon: Quincy Peacock MD;  Location: [facility] OR;  Service: OB/GYN;  Laterality: N/A;  MyoSure to remove endometrial polyps ERWIN ARRIETAGURU [phone] WILL BE HERE FOR CASE 7-  RN PREOP 7/8/2020  T/S---UPT IN AM  [facility] NEEDED-----COVID NEGATIVE     Family History   Problem Relation Age of Onset    Heart disease Mother     Hypertension Mother      Social History     Tobacco Use    Smoking status: Never Smoker    Smokeless tobacco: Never Used   Substance Use Topics    Alcohol use: No    Drug use: No     Review of Systems   Constitutional: Positive for  fatigue. Negative for fever.   HENT: Positive for sore throat.    Respiratory: Positive for shortness of breath.    Cardiovascular: Positive for palpitations. Negative for chest pain.   Gastrointestinal: Negative for abdominal pain, diarrhea, nausea and vomiting.   Genitourinary: Negative for dysuria.   Musculoskeletal: Negative for back pain.   Skin: Negative for rash.   Neurological: Positive for headaches. Negative for weakness.   Psychiatric/Behavioral: Negative for behavioral problems.   All other systems reviewed and are negative.      Physical Exam     Initial Vitals [03/04/22 2355]   BP Pulse Resp Temp SpO2   120/83 91 18 97.9 °F (36.6 °C) 98 %      MAP       --         Physical Exam    Nursing note and vitals reviewed.  Constitutional: She appears well-developed and well-nourished.   HENT:   Head: Normocephalic and atraumatic.   Eyes: Conjunctivae are normal.   Neck: Neck supple.   Normal range of motion.  Cardiovascular: Normal rate, regular rhythm and normal heart sounds. Exam reveals no gallop and no friction rub.    No murmur heard.  Pulmonary/Chest: Breath sounds normal. No respiratory distress. She has no wheezes. She has no rhonchi. She has no rales.   Abdominal: Abdomen is soft. There is no abdominal tenderness.   Musculoskeletal:         General: No edema. Normal range of motion.      Cervical back: Normal range of motion and neck supple.     Neurological: She is alert and oriented to person, place, and time. GCS score is 15. GCS eye subscore is 4. GCS verbal subscore is 5. GCS motor subscore is 6.   Skin: Skin is warm and dry.   Psychiatric: She has a normal mood and affect.         ED Course   Procedures  Labs Reviewed   SARS-COV-2 (COVID-19) QUALITATIVE PCR   POCT URINE PREGNANCY     EKG Readings: (Independently Interpreted)   Rhythm: Normal Sinus Rhythm. Heart Rate: 89. Ectopy: No Ectopy. Conduction: Normal. ST Segments: Normal ST Segments. T Waves: Normal. Clinical Impression: Normal Sinus  Rhythm       Imaging Results    None          Medications - No data to display  Medical Decision Making:   Initial Assessment:   Jennifer Murray 36 y.o. female, with no known pertinent PMHx, presents to the ED with palpitations, SOB, fatigue, and sore throat for more than a week and a headache for 3-4 days. Patient describes her headache as blood rushing from one side to another. Patient also reports she has recently seen her PCP for cough and was prescribed medication for her symptoms. Patient denies fever or other associated symptoms.   Independently Interpreted Test(s):   I have ordered and independently interpreted EKG Reading(s) - see prior notes  Clinical Tests:   Lab Tests: Ordered and Reviewed  The following lab test(s) were unremarkable: UPT  Medical Tests: Ordered and Reviewed  ED Management:  EKG was within normal limits.  Routine COVID screen was performed and is pending.  Patient was given instructions for palpitations and advised to follow-up with her primary care physician within the next 3 days for re-evaluation/return to the emergency department if condition worsens.          Scribe Attestation:   Scribe #1: I performed the above scribed service and the documentation accurately describes the services I performed. I attest to the accuracy of the note.               This document was produced by a scribe under my direction and in my presence. I agree with the content of the note and have made any necessary edits.     Dr. Ledezma    03/05/2022 8:59 PM    Clinical Impression:   Final diagnoses:  [R00.2] Palpitations          ED Disposition Condition    Discharge Stable        ED Prescriptions     None        Follow-up Information     Follow up With Specialties Details Why Contact Info    Jose Murray MD Internal Medicine Schedule an appointment as soon as possible for a visit in 2 days For reevaluation [address] [place] [id number]  [phone]             Malick Ledezma MD  03/05/22 2052

## 2022-03-07 LAB
SARS-COV-2 RNA RESP QL NAA+PROBE: NOT DETECTED
SARS-COV-2- CYCLE NUMBER: NORMAL

## 2023-07-05 ENCOUNTER — OFFICE VISIT (OUTPATIENT)
Dept: OBSTETRICS AND GYNECOLOGY | Facility: CLINIC | Age: 38
End: 2023-07-05
Payer: MEDICAID

## 2023-07-05 VITALS
SYSTOLIC BLOOD PRESSURE: 124 MMHG | HEIGHT: 60 IN | BODY MASS INDEX: 25.5 KG/M2 | DIASTOLIC BLOOD PRESSURE: 74 MMHG | WEIGHT: 129.88 LBS

## 2023-07-05 DIAGNOSIS — N92.0 MENORRHAGIA WITH REGULAR CYCLE: ICD-10-CM

## 2023-07-05 DIAGNOSIS — N94.6 DYSMENORRHEA: ICD-10-CM

## 2023-07-05 DIAGNOSIS — Z86.32 HISTORY OF GESTATIONAL DIABETES: ICD-10-CM

## 2023-07-05 DIAGNOSIS — N92.1 MENORRHAGIA WITH IRREGULAR CYCLE: ICD-10-CM

## 2023-07-05 DIAGNOSIS — Z01.419 WELL WOMAN EXAM WITH ROUTINE GYNECOLOGICAL EXAM: Primary | ICD-10-CM

## 2023-07-05 PROCEDURE — 1159F MED LIST DOCD IN RCRD: CPT | Mod: CPTII,,, | Performed by: OBSTETRICS & GYNECOLOGY

## 2023-07-05 PROCEDURE — 3008F BODY MASS INDEX DOCD: CPT | Mod: CPTII,,, | Performed by: OBSTETRICS & GYNECOLOGY

## 2023-07-05 PROCEDURE — 99999 PR PBB SHADOW E&M-EST. PATIENT-LVL III: ICD-10-PCS | Mod: PBBFAC,,, | Performed by: OBSTETRICS & GYNECOLOGY

## 2023-07-05 PROCEDURE — 99213 OFFICE O/P EST LOW 20 MIN: CPT | Mod: PBBFAC | Performed by: OBSTETRICS & GYNECOLOGY

## 2023-07-05 PROCEDURE — 1160F PR REVIEW ALL MEDS BY PRESCRIBER/CLIN PHARMACIST DOCUMENTED: ICD-10-PCS | Mod: CPTII,,, | Performed by: OBSTETRICS & GYNECOLOGY

## 2023-07-05 PROCEDURE — 4010F PR ACE/ARB THEARPY RXD/TAKEN: ICD-10-PCS | Mod: CPTII,,, | Performed by: OBSTETRICS & GYNECOLOGY

## 2023-07-05 PROCEDURE — 3074F PR MOST RECENT SYSTOLIC BLOOD PRESSURE < 130 MM HG: ICD-10-PCS | Mod: CPTII,,, | Performed by: OBSTETRICS & GYNECOLOGY

## 2023-07-05 PROCEDURE — 3078F PR MOST RECENT DIASTOLIC BLOOD PRESSURE < 80 MM HG: ICD-10-PCS | Mod: CPTII,,, | Performed by: OBSTETRICS & GYNECOLOGY

## 2023-07-05 PROCEDURE — 3074F SYST BP LT 130 MM HG: CPT | Mod: CPTII,,, | Performed by: OBSTETRICS & GYNECOLOGY

## 2023-07-05 PROCEDURE — 99395 PR PREVENTIVE VISIT,EST,18-39: ICD-10-PCS | Mod: S$PBB,,, | Performed by: OBSTETRICS & GYNECOLOGY

## 2023-07-05 PROCEDURE — 99999 PR PBB SHADOW E&M-EST. PATIENT-LVL III: CPT | Mod: PBBFAC,,, | Performed by: OBSTETRICS & GYNECOLOGY

## 2023-07-05 PROCEDURE — 3008F PR BODY MASS INDEX (BMI) DOCUMENTED: ICD-10-PCS | Mod: CPTII,,, | Performed by: OBSTETRICS & GYNECOLOGY

## 2023-07-05 PROCEDURE — 99395 PREV VISIT EST AGE 18-39: CPT | Mod: S$PBB,,, | Performed by: OBSTETRICS & GYNECOLOGY

## 2023-07-05 PROCEDURE — 4010F ACE/ARB THERAPY RXD/TAKEN: CPT | Mod: CPTII,,, | Performed by: OBSTETRICS & GYNECOLOGY

## 2023-07-05 PROCEDURE — 3078F DIAST BP <80 MM HG: CPT | Mod: CPTII,,, | Performed by: OBSTETRICS & GYNECOLOGY

## 2023-07-05 PROCEDURE — 1160F RVW MEDS BY RX/DR IN RCRD: CPT | Mod: CPTII,,, | Performed by: OBSTETRICS & GYNECOLOGY

## 2023-07-05 PROCEDURE — 1159F PR MEDICATION LIST DOCUMENTED IN MEDICAL RECORD: ICD-10-PCS | Mod: CPTII,,, | Performed by: OBSTETRICS & GYNECOLOGY

## 2023-07-05 RX ORDER — IBUPROFEN 600 MG/1
600 TABLET ORAL EVERY 6 HOURS PRN
Qty: 40 TABLET | Refills: 1 | Status: SHIPPED | OUTPATIENT
Start: 2023-07-05 | End: 2023-09-05

## 2023-07-05 RX ORDER — LEVOCETIRIZINE DIHYDROCHLORIDE 5 MG/1
5 TABLET, FILM COATED ORAL
COMMUNITY
Start: 2023-05-17

## 2023-07-05 RX ORDER — ERGOCALCIFEROL 1.25 MG/1
50000 CAPSULE ORAL
COMMUNITY
Start: 2023-05-17

## 2023-07-05 RX ORDER — LEVONORGESTREL AND ETHINYL ESTRADIOL 0.1-0.02MG
1 KIT ORAL DAILY
Qty: 28 TABLET | Refills: 11 | Status: SHIPPED | OUTPATIENT
Start: 2023-07-05

## 2023-07-05 RX ORDER — LEVONORGESTREL AND ETHINYL ESTRADIOL 0.1-0.02MG
KIT ORAL
Qty: 28 TABLET | Refills: 0 | Status: SHIPPED | OUTPATIENT
Start: 2023-07-05 | End: 2023-07-05 | Stop reason: SDUPTHER

## 2023-07-05 RX ORDER — CETIRIZINE HYDROCHLORIDE 10 MG/1
10 TABLET ORAL DAILY PRN
COMMUNITY
Start: 2023-05-29 | End: 2023-11-01 | Stop reason: SDUPTHER

## 2023-07-05 RX ORDER — LOSARTAN POTASSIUM 25 MG/1
25 TABLET ORAL
COMMUNITY
Start: 2023-06-01 | End: 2023-11-01 | Stop reason: SDUPTHER

## 2023-07-05 NOTE — PROGRESS NOTES
"  Subjective:       Patient ID: Jennifer Murray is a 37 y.o. female.    Chief Complaint:  Well Woman (Last normal pap was 2021) and Menstrual Problem      History of Present Illness  HPI  Annual Exam-Premenopausal  Patient presents for annual exam. The patient has no complaints today. The patient is sexually active. GYN screening history: last pap: approximate date 2021 and was normal. The patient wears seatbelts: yes. The patient participates in regular exercise: no. Has the patient ever been transfused or tattooed?: no. The patient reports that there is not domestic violence in her life.    Status post hysteroscopic polypectomy  On OCP.    Ran out a couple of days ago.  She thinks she might have the endometrial polyp again.  Started to have heavier bleeding again.    History of GDM. Recently tested by her PCP.  "Borderline"      GYN & OB History  Patient's last menstrual period was 2023 (exact date).   Date of Last Pap: No result found    OB History    Para Term  AB Living   3 2 2   1 2   SAB IAB Ectopic Multiple Live Births           2      # Outcome Date GA Lbr Jean Pierre/2nd Weight Sex Delivery Anes PTL Lv   3 AB 18           2 Term 11 40w0d  4.1 kg (9 lb 0.6 oz) M  EPI N DONNA   1 Term 04 40w0d  2.9 kg (6 lb 6.3 oz) M  None N DONNA     Past Medical History:   Diagnosis Date    Diabetes in pregnancy     hemoglobin 6.4 in second trimester    Hypertension        Past Surgical History:   Procedure Laterality Date    HYSTEROSCOPIC POLYPECTOMY OF UTERUS N/A 7/15/2020    Procedure: POLYPECTOMY, UTERUS, HYSTEROSCOPIC;  Surgeon: Quincy Peacock MD;  Location: [facility];  Service: OB/GYN;  Laterality: N/A;  MyoSure to remove endometrial polyps ERWIN LÓPEZ [phone] WILL BE HERE FOR CASE 2020  RN PREOP 2020  T/S---UPT IN AM  [facility]TTI NEEDED-----COVID NEGATIVE       Family History   Problem Relation Age of Onset    Heart disease Mother     Hypertension Mother        Social " History     Socioeconomic History    Marital status:    Tobacco Use    Smoking status: Never    Smokeless tobacco: Never   Substance and Sexual Activity    Alcohol use: No    Drug use: No    Sexual activity: Yes     Partners: Male   Social History Narrative     since 2003    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      clobetasoL (TEMOVATE) 0.05 % external solution Apply topically once daily.      fluticasone propionate (FLONASE) 50 mcg/actuation nasal spray by Each Nostril route 2 (two) times daily.      ketoconazole (NIZORAL) 2 % shampoo SHAMPOO TOPICALLY TO AFFECTED AREA ONCE DAILY. LATHER LEAVE IN PLACE FOR 5 MINUTES AND THEN RINSE OFF WITH WATER.      levocetirizine (XYZAL) 5 MG tablet Take 5 mg by mouth.      losartan (COZAAR) 25 MG tablet Take 25 mg by mouth.      promethazine-dextromethorphan (PROMETHAZINE-DM) 6.25-15 mg/5 mL Syrp Take by mouth.      triamcinolone acetonide 0.1% (KENALOG) 0.1 % cream Apply topically 2 (two) times daily.      VITAMIN D2 1,250 mcg (50,000 unit) capsule Take 50,000 Units by mouth every 7 days.      ibuprofen (ADVIL,MOTRIN) 600 MG tablet Take 1 tablet (600 mg total) by mouth every 6 (six) hours as needed for Pain. 40 tablet 1    levonorgestrel-ethinyl estradiol (AVIANE) 0.1-20 mg-mcg per tablet Take 1 tablet by mouth once daily. 28 tablet 11     No current facility-administered medications for this visit.       Review of patient's allergies indicates:  No Known Allergies      Review of Systems  Review of Systems   Constitutional:  Negative for activity change, appetite change, chills, fatigue, fever and unexpected weight change.   HENT:  Negative for mouth sores.    Respiratory:  Negative for cough, shortness of breath and wheezing.    Cardiovascular:  Negative for chest pain and palpitations.    Gastrointestinal:  Negative for abdominal pain, bloating, blood in stool, constipation, nausea and vomiting.   Endocrine: Negative for diabetes and hot flashes.   Genitourinary:  Positive for menstrual problem. Negative for dysmenorrhea, dyspareunia, dysuria, frequency, hematuria, menorrhagia, pelvic pain, urgency, vaginal bleeding, vaginal discharge, vaginal pain, urinary incontinence, postcoital bleeding and vaginal odor.   Musculoskeletal:  Negative for back pain and myalgias.   Integumentary:  Negative for rash, breast mass and nipple discharge.   Neurological:  Negative for seizures and headaches.   Psychiatric/Behavioral:  Negative for depression and sleep disturbance. The patient is not nervous/anxious.    Breast: Negative for mass, mastodynia and nipple discharge        Objective:    Physical Exam:   Constitutional: She appears well-developed and well-nourished. No distress.   BMI of 25.36    HENT:   Head: Normocephalic and atraumatic.    Eyes: EOM are normal.      Pulmonary/Chest: Effort normal. No respiratory distress.   Breasts: Non-tender, no engorgement, no masses, no retraction, no discharge. Negative for lymphadenopathy.         Abdominal: Soft. She exhibits no distension. There is no abdominal tenderness. There is no rebound and no guarding.     Genitourinary:    Uterus normal.   There is vaginal discharge in the vagina.    Genitourinary Comments: Vulva without any obvious lesions.  Urethral meatus normal size and location without any lesion.  Urethra is non-tender without stricture or discharge.  Bladder is non-tender.  Vaginal vault with good support.  Minimal bloody discharge noted.  No obvious lesion.  Normal rugation.  Cervix is without any cervical motion tenderness.  No obvious lesion.  Uterus is small, non-tender, normal contour.  Adnexa is without any masses or tenderness.  Perineum without obvious lesion.               Musculoskeletal: Normal range of motion.       Neurological: She is  alert.    Skin: Skin is warm and dry.    Psychiatric: She has a normal mood and affect.        Assessment:        1. Well woman exam with routine gynecological exam    2. History of gestational diabetes    3. Menorrhagia with regular cycle    4. Dysmenorrhea              Plan:          I have discussed with the patient her condition.  Monthly breast examination was instructed, discussed, and encouraged.  Patient was encouraged to consume a low-calorie, low fat diet, and to increase of physical activity.  Healthy habits encouraged.  A Pap smear was performed with HR-HPV according to the USPSTF recommendations.  Mammogram was not ordered because of the combination of her age and risk factors, according to ACOG guidelines.  Gonorrhea and Chlamydia testing not performed;  HIV test not offered, again according to guidelines.    We discussed  History of GDM.  She would continue to follow up with her PCP.  I told her that a proper diet and exercise regimen would help her control the development of diabetes.    We discussed OCP and menorrhagia.  She was encouraged to be compliant with medication.  Refills for Aviane and Motrin given.  Pelvic ultrasound per request.  Not sure if she would want another hysteroscopy and polypectomy.  ?Considering hysterectomy     She will come back to see me in one year for her annual visit.  She can come back to see me sooner as necessary.  All of her questions were answered appropriately to her satisfaction.

## 2023-07-06 ENCOUNTER — HOSPITAL ENCOUNTER (OUTPATIENT)
Dept: RADIOLOGY | Facility: HOSPITAL | Age: 38
Discharge: HOME | End: 2023-07-06
Attending: OBSTETRICS & GYNECOLOGY
Payer: MEDICAID

## 2023-07-06 DIAGNOSIS — N94.6 DYSMENORRHEA: ICD-10-CM

## 2023-07-06 DIAGNOSIS — N92.0 MENORRHAGIA WITH REGULAR CYCLE: ICD-10-CM

## 2023-07-06 PROCEDURE — 76830 TRANSVAGINAL US NON-OB: CPT | Mod: 26,,, | Performed by: RADIOLOGY

## 2023-07-06 PROCEDURE — 76856 US EXAM PELVIC COMPLETE: CPT | Mod: TC

## 2023-07-06 PROCEDURE — 76856 US PELVIS COMP WITH TRANSVAG NON-OB (XPD): ICD-10-PCS | Mod: 26,,, | Performed by: RADIOLOGY

## 2023-07-06 PROCEDURE — 76830 US PELVIS COMP WITH TRANSVAG NON-OB (XPD): ICD-10-PCS | Mod: 26,,, | Performed by: RADIOLOGY

## 2023-07-06 PROCEDURE — 76856 US EXAM PELVIC COMPLETE: CPT | Mod: 26,,, | Performed by: RADIOLOGY

## 2023-09-05 ENCOUNTER — OFFICE VISIT (OUTPATIENT)
Dept: OBSTETRICS AND GYNECOLOGY | Facility: CLINIC | Age: 38
End: 2023-09-05
Payer: MEDICAID

## 2023-09-05 VITALS
SYSTOLIC BLOOD PRESSURE: 108 MMHG | HEIGHT: 60 IN | WEIGHT: 130.06 LBS | BODY MASS INDEX: 25.53 KG/M2 | DIASTOLIC BLOOD PRESSURE: 62 MMHG

## 2023-09-05 DIAGNOSIS — N94.6 DYSMENORRHEA: ICD-10-CM

## 2023-09-05 DIAGNOSIS — D25.9 UTERINE LEIOMYOMA, UNSPECIFIED LOCATION: Primary | ICD-10-CM

## 2023-09-05 DIAGNOSIS — N92.0 MENORRHAGIA WITH REGULAR CYCLE: ICD-10-CM

## 2023-09-05 PROCEDURE — 99999 PR PBB SHADOW E&M-EST. PATIENT-LVL III: ICD-10-PCS | Mod: PBBFAC,,, | Performed by: OBSTETRICS & GYNECOLOGY

## 2023-09-05 PROCEDURE — 99213 OFFICE O/P EST LOW 20 MIN: CPT | Mod: PBBFAC | Performed by: OBSTETRICS & GYNECOLOGY

## 2023-09-05 PROCEDURE — 1160F RVW MEDS BY RX/DR IN RCRD: CPT | Mod: CPTII,,, | Performed by: OBSTETRICS & GYNECOLOGY

## 2023-09-05 PROCEDURE — 1160F PR REVIEW ALL MEDS BY PRESCRIBER/CLIN PHARMACIST DOCUMENTED: ICD-10-PCS | Mod: CPTII,,, | Performed by: OBSTETRICS & GYNECOLOGY

## 2023-09-05 PROCEDURE — 3074F SYST BP LT 130 MM HG: CPT | Mod: CPTII,,, | Performed by: OBSTETRICS & GYNECOLOGY

## 2023-09-05 PROCEDURE — 99213 OFFICE O/P EST LOW 20 MIN: CPT | Mod: S$PBB,,, | Performed by: OBSTETRICS & GYNECOLOGY

## 2023-09-05 PROCEDURE — 3074F PR MOST RECENT SYSTOLIC BLOOD PRESSURE < 130 MM HG: ICD-10-PCS | Mod: CPTII,,, | Performed by: OBSTETRICS & GYNECOLOGY

## 2023-09-05 PROCEDURE — 99999 PR PBB SHADOW E&M-EST. PATIENT-LVL III: CPT | Mod: PBBFAC,,, | Performed by: OBSTETRICS & GYNECOLOGY

## 2023-09-05 PROCEDURE — 4010F PR ACE/ARB THEARPY RXD/TAKEN: ICD-10-PCS | Mod: CPTII,,, | Performed by: OBSTETRICS & GYNECOLOGY

## 2023-09-05 PROCEDURE — 3008F BODY MASS INDEX DOCD: CPT | Mod: CPTII,,, | Performed by: OBSTETRICS & GYNECOLOGY

## 2023-09-05 PROCEDURE — 1159F PR MEDICATION LIST DOCUMENTED IN MEDICAL RECORD: ICD-10-PCS | Mod: CPTII,,, | Performed by: OBSTETRICS & GYNECOLOGY

## 2023-09-05 PROCEDURE — 4010F ACE/ARB THERAPY RXD/TAKEN: CPT | Mod: CPTII,,, | Performed by: OBSTETRICS & GYNECOLOGY

## 2023-09-05 PROCEDURE — 3008F PR BODY MASS INDEX (BMI) DOCUMENTED: ICD-10-PCS | Mod: CPTII,,, | Performed by: OBSTETRICS & GYNECOLOGY

## 2023-09-05 PROCEDURE — 3078F PR MOST RECENT DIASTOLIC BLOOD PRESSURE < 80 MM HG: ICD-10-PCS | Mod: CPTII,,, | Performed by: OBSTETRICS & GYNECOLOGY

## 2023-09-05 PROCEDURE — 1159F MED LIST DOCD IN RCRD: CPT | Mod: CPTII,,, | Performed by: OBSTETRICS & GYNECOLOGY

## 2023-09-05 PROCEDURE — 99213 PR OFFICE/OUTPT VISIT, EST, LEVL III, 20-29 MIN: ICD-10-PCS | Mod: S$PBB,,, | Performed by: OBSTETRICS & GYNECOLOGY

## 2023-09-05 PROCEDURE — 3078F DIAST BP <80 MM HG: CPT | Mod: CPTII,,, | Performed by: OBSTETRICS & GYNECOLOGY

## 2023-09-05 RX ORDER — IBUPROFEN 800 MG/1
800 TABLET ORAL EVERY 8 HOURS PRN
COMMUNITY
Start: 2023-08-25 | End: 2024-02-29 | Stop reason: SDUPTHER

## 2023-09-05 NOTE — PROGRESS NOTES
Subjective:      Patient ID: Jennifer Murray is a 38 y.o. female.    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    COMPARISON:  10/31/2019.    FINDINGS:  Uterus:    Size: 8.0 x 5.0 x 4.9 cm, retroverted    Masses: There is an intramural uterine fibroid measuring 2.3 x 2.0 x 2.0 cm.  There is an intramural uterine fibroid measuring 1.5 x 1.7 x 1.3 cm.  There is an intramural fibroid measuring 1.0 x 1.1 x 1.2 cm.    Endometrium: Normal in this pre menopausal patient, measuring 3.0 mm.    Right ovary:    Size: 2.8 x 2.5 x 1.6 cm    Appearance: Normal    Vascular flow: Normal.    Left ovary:    Size: 4.0 x 1.7 x 1.6 cm    Appearance: Normal    Vascular Flow: Normal.    Free Fluid:    None.    Impression  Multiple uterine fibroids measuring up to 2.3 cm.  Otherwise, unremarkable examination      Does not want surgery at this time.       GYN & OB History  Patient's last menstrual period was 2023 (within days).   Date of Last Pap: No result found    OB History    Para Term  AB Living   3 2 2   1 2   SAB IAB Ectopic Multiple Live Births           2      # Outcome Date GA Lbr Jean Pierre/2nd Weight Sex Delivery Anes PTL Lv   3 AB 18           2 Term 11 40w0d  4.1 kg (9 lb 0.6 oz) M  EPI N DONNA   1 Term 04 40w0d  2.9 kg (6 lb 6.3 oz) M  None N DONNA     Past Medical  History:   Diagnosis Date    Diabetes in pregnancy     hemoglobin 6.4 in second trimester    Hypertension        Past Surgical History:   Procedure Laterality Date    HYSTEROSCOPIC POLYPECTOMY OF UTERUS N/A 7/15/2020    Procedure: POLYPECTOMY, UTERUS, HYSTEROSCOPIC;  Surgeon: Quincy Peacock MD;  Location: [facility];  Service: OB/GYN;  Laterality: N/A;  MyoSure to remove endometrial polyps ERWIN LÓPEZ [phone] WILL BE HERE FOR CASE 7-  RN PREOP 7/8/2020  T/S---UPT IN AM  [facility] NEED[facility]-----COVID NEGATIVE       Family History   Problem Relation Age of Onset    Heart disease Mother     Hypertension Mother        Social History     Socioeconomic History    Marital status:    Tobacco Use    Smoking status: Never    Smokeless tobacco: Never   Substance and Sexual Activity    Alcohol use: No    Drug use: No    Sexual activity: Yes     Partners: Male   Social History Narrative     since 2003    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      clobetasoL (TEMOVATE) 0.05 % external solution Apply topically once daily.      fluticasone propionate (FLONASE) 50 mcg/actuation nasal spray by Each Nostril route 2 (two) times daily.      ibuprofen (ADVIL,MOTRIN) 800 MG tablet Take 800 mg by mouth every 8 (eight) hours as needed.      ketoconazole (NIZORAL) 2 % shampoo SHAMPOO TOPICALLY TO AFFECTED AREA ONCE DAILY. LATHER LEAVE IN PLACE FOR 5 MINUTES AND THEN RINSE OFF WITH WATER.      levocetirizine (XYZAL) 5 MG tablet Take 5 mg by mouth.      levonorgestrel-ethinyl estradiol (AVIANE) 0.1-20 mg-mcg per tablet Take 1 tablet by mouth once daily. 28 tablet 11    losartan (COZAAR) 25 MG tablet Take 25 mg by mouth.      promethazine-dextromethorphan (PROMETHAZINE-DM) 6.25-15 mg/5 mL Syrp Take by mouth.      triamcinolone acetonide 0.1% (KENALOG) 0.1 % cream  Apply topically 2 (two) times daily.      VITAMIN D2 1,250 mcg (50,000 unit) capsule Take 50,000 Units by mouth every 7 days.       No current facility-administered medications for this visit.       Review of patient's allergies indicates:  No Known Allergies    Review of Systems  Review of Systems   Constitutional:  Negative for activity change, appetite change, chills, fatigue, fever and unexpected weight change.   HENT:  Negative for mouth sores.    Respiratory:  Negative for cough, shortness of breath and wheezing.    Cardiovascular:  Negative for chest pain and palpitations.   Gastrointestinal:  Negative for abdominal pain, bloating, blood in stool, constipation, nausea and vomiting.   Endocrine: Negative for diabetes and hot flashes.   Genitourinary:  Positive for menorrhagia and menstrual problem. Negative for dysmenorrhea, dyspareunia, dysuria, frequency, hematuria, pelvic pain, urgency, vaginal bleeding, vaginal discharge, vaginal pain, urinary incontinence, postcoital bleeding and vaginal odor.   Musculoskeletal:  Negative for back pain and myalgias.   Integumentary:  Negative for rash, breast mass and nipple discharge.   Neurological:  Negative for seizures and headaches.   Psychiatric/Behavioral:  Negative for depression and sleep disturbance. The patient is not nervous/anxious.    Breast: Negative for mass, mastodynia and nipple discharge         Objective:     Physical Exam:   Constitutional: She appears well-developed and well-nourished. No distress.   BMI of 25.40    HENT:   Head: Normocephalic and atraumatic.    Eyes: EOM are normal.      Pulmonary/Chest: Effort normal. No respiratory distress.   Breasts: Non-tender, no engorgement, no masses, no retraction, no discharge. Negative for lymphadenopathy.         Abdominal: Soft. She exhibits no distension. There is no abdominal tenderness. There is no rebound and no guarding.     Genitourinary: No  no vaginal discharge in the vagina.            Musculoskeletal: Normal range of motion.       Neurological: She is alert.    Skin: Skin is warm and dry.    Psychiatric: She has a normal mood and affect.         Assessment:     1. Uterine leiomyoma, unspecified location    2. Menorrhagia with regular cycle    3. Dysmenorrhea             Plan:     I have discussed with the patient regarding her condition.  She does not wan to get surgery anytime soon.  Symptoms are tolerable.    Repeat ultrasound next year  Back next year for her annual visit

## 2024-02-29 DIAGNOSIS — N94.6 DYSMENORRHEA: ICD-10-CM

## 2024-02-29 RX ORDER — IBUPROFEN 800 MG/1
800 TABLET ORAL EVERY 8 HOURS PRN
Qty: 30 TABLET | Refills: 0 | Status: SHIPPED | OUTPATIENT
Start: 2024-02-29 | End: 2024-05-15 | Stop reason: SDUPTHER

## 2024-02-29 RX ORDER — IBUPROFEN 600 MG/1
600 TABLET ORAL EVERY 6 HOURS PRN
Qty: 40 TABLET | Refills: 0 | OUTPATIENT
Start: 2024-02-29

## 2024-02-29 NOTE — TELEPHONE ENCOUNTER
Refill Routing Note   Medication(s) are not appropriate for processing by [facility] for the following reason(s):        Outside of protocol  No active prescription written by provider    [facility] action(s):  Route  Quick Discontinue           Pharmacist review requested: Yes     Appointments  past 12m or future 3m with PCP    Date Provider   Last Visit   9/5/2023 Quincy Peacock MD   Next Visit   Visit date not found Quincy Peacock MD   ED visits in past 90 days: 0        Note composed:4:08 PM 02/29/2024

## 2024-02-29 NOTE — TELEPHONE ENCOUNTER
Refill Routing Note   Medication(s) are not appropriate for processing by [facility] for the following reason(s):        Outside of protocol    [facility] action(s):  Defer        Medication Therapy Plan: OOP to be QDC.    Pharmacist review requested: Yes     Appointments  past 12m or future 3m with PCP    Date Provider   Last Visit   9/5/2023 Quincy Peacock MD   Next Visit   Visit date not found Quincy Peacock MD   ED visits in past 90 days: 0        Note composed:3:56 PM 02/29/2024

## 2024-06-26 ENCOUNTER — OFFICE VISIT (OUTPATIENT)
Dept: OBSTETRICS AND GYNECOLOGY | Facility: CLINIC | Age: 39
End: 2024-06-26
Payer: MEDICAID

## 2024-06-26 VITALS
SYSTOLIC BLOOD PRESSURE: 120 MMHG | WEIGHT: 132.25 LBS | DIASTOLIC BLOOD PRESSURE: 72 MMHG | BODY MASS INDEX: 25.97 KG/M2 | HEIGHT: 60 IN

## 2024-06-26 DIAGNOSIS — N92.0 MENORRHAGIA WITH REGULAR CYCLE: ICD-10-CM

## 2024-06-26 DIAGNOSIS — Z86.32 HISTORY OF GESTATIONAL DIABETES: ICD-10-CM

## 2024-06-26 DIAGNOSIS — Z01.419 WELL WOMAN EXAM WITH ROUTINE GYNECOLOGICAL EXAM: Primary | ICD-10-CM

## 2024-06-26 PROCEDURE — 1160F RVW MEDS BY RX/DR IN RCRD: CPT | Mod: CPTII,,, | Performed by: OBSTETRICS & GYNECOLOGY

## 2024-06-26 PROCEDURE — 3074F SYST BP LT 130 MM HG: CPT | Mod: CPTII,,, | Performed by: OBSTETRICS & GYNECOLOGY

## 2024-06-26 PROCEDURE — 99999 PR PBB SHADOW E&M-EST. PATIENT-LVL III: CPT | Mod: PBBFAC,,, | Performed by: OBSTETRICS & GYNECOLOGY

## 2024-06-26 PROCEDURE — 99395 PREV VISIT EST AGE 18-39: CPT | Mod: S$PBB,,, | Performed by: OBSTETRICS & GYNECOLOGY

## 2024-06-26 PROCEDURE — 3078F DIAST BP <80 MM HG: CPT | Mod: CPTII,,, | Performed by: OBSTETRICS & GYNECOLOGY

## 2024-06-26 PROCEDURE — 4010F ACE/ARB THERAPY RXD/TAKEN: CPT | Mod: CPTII,,, | Performed by: OBSTETRICS & GYNECOLOGY

## 2024-06-26 PROCEDURE — 99213 OFFICE O/P EST LOW 20 MIN: CPT | Mod: PBBFAC | Performed by: OBSTETRICS & GYNECOLOGY

## 2024-06-26 PROCEDURE — 1159F MED LIST DOCD IN RCRD: CPT | Mod: CPTII,,, | Performed by: OBSTETRICS & GYNECOLOGY

## 2024-06-26 PROCEDURE — 3008F BODY MASS INDEX DOCD: CPT | Mod: CPTII,,, | Performed by: OBSTETRICS & GYNECOLOGY

## 2024-06-26 RX ORDER — LEVONORGESTREL/ETHIN.ESTRADIOL 0.1-0.02MG
1 TABLET ORAL DAILY
Qty: 28 TABLET | Refills: 11 | Status: SHIPPED | OUTPATIENT
Start: 2024-06-26

## 2024-06-26 NOTE — PROGRESS NOTES
"  Subjective:       Patient ID: Jennifer Murray is a 38 y.o. female.    Chief Complaint:  Well Woman (Last normal pap with Negative HPV was 2021. Pt needs refill on her OCP)      History of Present Illness  HPI  Annual Exam-Premenopausal  Patient presents for annual exam. The patient has no complaints today. The patient is sexually active. GYN screening history: last pap: approximate date 2021 and was normal. The patient wears seatbelts: yes. The patient participates in regular exercise: no. Has the patient ever been transfused or tattooed?: no. The patient reports that there is not domestic violence in her life.    Status post hysteroscopic polypectomy  On OCP.  Doing well.  History of uterine fibroids on ultrasound last year.  Asymptomatic at this time    History of GDM. Recently tested by her PCP.  "Borderline"      GYN & OB History  Patient's last menstrual period was 2024 (approximate).   Date of Last Pap: 10/2/2021    OB History    Para Term  AB Living   3 2 2   1 2   SAB IAB Ectopic Multiple Live Births           2      # Outcome Date GA Lbr Jean Pierre/2nd Weight Sex Type Anes PTL Lv   3 AB 18           2 Term 11 40w0d  4.1 kg (9 lb 0.6 oz) M  EPI N DONNA   1 Term 04 40w0d  2.9 kg (6 lb 6.3 oz) M  None N DONNA     Past Medical History:   Diagnosis Date    Diabetes in pregnancy     hemoglobin 6.4 in second trimester    Hypertension        Past Surgical History:   Procedure Laterality Date    HYSTEROSCOPIC POLYPECTOMY OF UTERUS N/A 7/15/2020    Procedure: POLYPECTOMY, UTERUS, HYSTEROSCOPIC;  Surgeon: Quincy Peacock MD;  Location: [facility];  Service: OB/GYN;  Laterality: N/A;  MyoSure to remove endometrial polyps ERWIN LÓPEZ [phone] WILL BE HERE FOR CASE 2020  RN PREOP 2020  T/S---UPT IN AM  MARTTI NEEDED-----COVID NEGATIVE       Family History   Problem Relation Name Age of Onset    Heart disease Mother      Hypertension Mother         Social History "     Socioeconomic History    Marital status:    Tobacco Use    Smoking status: Never    Smokeless tobacco: Never   Substance and Sexual Activity    Alcohol use: No    Drug use: No    Sexual activity: Yes     Partners: Male   Social History Narrative     since 2003    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 90 tablet 3    levonorgestrel-ethinyl estradiol (AVIANE) 0.1-20 mg-mcg per tablet Take 1 tablet by mouth once daily. 28 tablet 11     No current facility-administered medications for this visit.       Review of patient's allergies indicates:  No Known Allergies      Review of Systems  Review of Systems   Constitutional:  Negative for activity change, appetite change, chills, fatigue, fever and unexpected weight change.   HENT:  Negative for mouth sores.    Respiratory:  Negative for cough, shortness of breath and wheezing.    Cardiovascular:  Negative for chest pain and palpitations.   Gastrointestinal:  Negative for abdominal pain, bloating, blood in stool, constipation, nausea and vomiting.   Endocrine: Negative for diabetes and hot flashes.   Genitourinary:  Positive for menstrual problem. Negative for dysmenorrhea, dyspareunia, dysuria, frequency, hematuria, menorrhagia, pelvic pain, urgency, vaginal bleeding, vaginal discharge, vaginal pain, urinary incontinence, postcoital bleeding and vaginal odor.   Musculoskeletal:  Negative for back pain and myalgias.   Integumentary:  Negative for rash, breast mass and nipple discharge.   Neurological:  Negative for seizures and headaches.   Psychiatric/Behavioral:  Negative for depression and sleep disturbance. The patient is not nervous/anxious.    Breast: Negative for mass, mastodynia and nipple discharge          Objective:    Physical Exam:   Constitutional: She appears  well-developed and well-nourished. No distress.   BMI of 25.83    HENT:   Head: Normocephalic and atraumatic.    Eyes: EOM are normal.      Pulmonary/Chest: Effort normal. No respiratory distress.   Breasts: Non-tender, no engorgement, no masses, no retraction, no discharge. Negative for lymphadenopathy.         Abdominal: Soft. She exhibits no distension. There is no abdominal tenderness. There is no rebound and no guarding.     Genitourinary:    Uterus normal.   No vaginal discharge in the vagina.    Genitourinary Comments: Vulva without any obvious lesions.  Urethral meatus normal size and location without any lesion.  Urethra is non-tender without stricture or discharge.  Bladder is non-tender.  Vaginal vault with good support.  Minimal bloody discharge noted.  No obvious lesion.  Normal rugation.  Cervix is without any cervical motion tenderness.  No obvious lesion.  Uterus is about 8 weeks, retroverted, non-tender.  Adnexa is without any masses or tenderness.  Perineum without obvious lesion.               Musculoskeletal: Normal range of motion.       Neurological: She is alert.    Skin: Skin is warm and dry.    Psychiatric: She has a normal mood and affect.          Assessment:        1. Well woman exam with routine gynecological exam    2. History of gestational diabetes    3. Menorrhagia with regular cycle              Plan:          I have discussed with the patient her condition.  Monthly breast examination was instructed, discussed, and encouraged.  Patient was encouraged to consume a low-calorie, low fat diet, and to increase of physical activity.  Healthy habits encouraged.  A Pap smear was performed with HR-HPV according to the USPSTF recommendations.  Mammogram was not ordered because of the combination of her age and risk factors, according to ACOG guidelines.  Gonorrhea and Chlamydia testing not performed;  HIV test not offered, again according to guidelines.    We discussed  History of GDM.  She  would continue to follow up with her PCP.  I told her that a proper diet and exercise regimen would help her control the development of diabetes.    Refills for Alesse per request.  Will continue to monitor symptoms of uterine fibroids     She will come back to see me in one year for her annual visit.  She can come back to see me sooner as necessary.  All of her questions were answered appropriately to her satisfaction.          ** A female chaperone, Paz Meija, was present for the pelvic exam

## 2024-10-26 DIAGNOSIS — N92.0 MENORRHAGIA WITH REGULAR CYCLE: ICD-10-CM

## 2024-10-27 RX ORDER — LEVONORGESTREL AND ETHINYL ESTRADIOL 0.1-0.02MG
1 KIT ORAL
Qty: 84 TABLET | Refills: 2 | Status: SHIPPED | OUTPATIENT
Start: 2024-10-27

## 2025-03-24 ENCOUNTER — HOSPITAL ENCOUNTER (EMERGENCY)
Facility: HOSPITAL | Age: 40
Discharge: HOME | End: 2025-03-24
Attending: EMERGENCY MEDICINE

## 2025-03-24 VITALS
TEMPERATURE: 98 F | SYSTOLIC BLOOD PRESSURE: 139 MMHG | OXYGEN SATURATION: 99 % | BODY MASS INDEX: 24.54 KG/M2 | WEIGHT: 125 LBS | RESPIRATION RATE: 18 BRPM | DIASTOLIC BLOOD PRESSURE: 86 MMHG | HEIGHT: 60 IN | HEART RATE: 87 BPM

## 2025-03-24 DIAGNOSIS — M54.9 BACK PAIN, UNSPECIFIED BACK LOCATION, UNSPECIFIED BACK PAIN LATERALITY, UNSPECIFIED CHRONICITY: ICD-10-CM

## 2025-03-24 DIAGNOSIS — R53.83 FATIGUE: ICD-10-CM

## 2025-03-24 DIAGNOSIS — R51.9 NONINTRACTABLE HEADACHE, UNSPECIFIED CHRONICITY PATTERN, UNSPECIFIED HEADACHE TYPE: Primary | ICD-10-CM

## 2025-03-24 LAB
ABSOLUTE EOSINOPHIL (OHS): 0.12 K/UL
ABSOLUTE MONOCYTE (OHS): 0.53 K/UL (ref 0.3–1)
ABSOLUTE NEUTROPHIL COUNT (OHS): 5.05 K/UL (ref 1.8–7.7)
ALBUMIN SERPL BCP-MCNC: 4.1 G/DL (ref 3.5–5.2)
ALP SERPL-CCNC: 54 UNIT/L (ref 40–150)
ALT SERPL W/O P-5'-P-CCNC: 9 UNIT/L (ref 10–44)
ANION GAP (OHS): 9 MMOL/L (ref 8–16)
AST SERPL-CCNC: 14 UNIT/L (ref 11–45)
B-HCG UR QL: NEGATIVE
BASOPHILS # BLD AUTO: 0.06 K/UL
BASOPHILS NFR BLD AUTO: 0.8 %
BILIRUB SERPL-MCNC: 0.3 MG/DL (ref 0.1–1)
BILIRUB UR QL STRIP.AUTO: NEGATIVE
BNP SERPL-MCNC: 35 PG/ML (ref 0–99)
BUN SERPL-MCNC: 10 MG/DL (ref 6–20)
CALCIUM SERPL-MCNC: 9.1 MG/DL (ref 8.7–10.5)
CHLORIDE SERPL-SCNC: 108 MMOL/L (ref 95–110)
CLARITY UR: ABNORMAL
CO2 SERPL-SCNC: 22 MMOL/L (ref 23–29)
COLOR UR AUTO: YELLOW
CREAT SERPL-MCNC: 0.7 MG/DL (ref 0.5–1.4)
CTP QC/QA: YES
ERYTHROCYTE [DISTWIDTH] IN BLOOD BY AUTOMATED COUNT: 13.1 % (ref 11.5–14.5)
GFR SERPLBLD CREATININE-BSD FMLA CKD-EPI: >60 ML/MIN/1.73/M2
GLUCOSE SERPL-MCNC: 102 MG/DL (ref 70–110)
GLUCOSE SERPL-MCNC: 111 MG/DL (ref 70–110)
GLUCOSE UR QL STRIP: NEGATIVE
HCT VFR BLD AUTO: 37.9 % (ref 37–48.5)
HGB BLD-MCNC: 13 GM/DL (ref 12–16)
HGB UR QL STRIP: ABNORMAL
IMM GRANULOCYTES # BLD AUTO: 0.02 K/UL (ref 0–0.04)
IMM GRANULOCYTES NFR BLD AUTO: 0.3 % (ref 0–0.5)
KETONES UR QL STRIP: NEGATIVE
LEUKOCYTE ESTERASE UR QL STRIP: NEGATIVE
LYMPHOCYTES # BLD AUTO: 2.18 K/UL (ref 1–4.8)
MCH RBC QN AUTO: 30.2 PG (ref 27–50)
MCHC RBC AUTO-ENTMCNC: 34.3 G/DL (ref 32–36)
MCV RBC AUTO: 88 FL (ref 82–98)
NITRITE UR QL STRIP: NEGATIVE
NUCLEATED RBC (/100WBC) (OHS): 0 /100 WBC
PH UR STRIP: 8 [PH]
PLATELET # BLD AUTO: 318 K/UL (ref 150–450)
PMV BLD AUTO: 10.4 FL (ref 9.2–12.9)
POCT GLUCOSE: 111 MG/DL (ref 70–110)
POTASSIUM SERPL-SCNC: 3.7 MMOL/L (ref 3.5–5.1)
PROT SERPL-MCNC: 8.1 GM/DL (ref 6–8.4)
PROT UR QL STRIP: NEGATIVE
RBC # BLD AUTO: 4.31 M/UL (ref 4–5.4)
RELATIVE EOSINOPHIL (OHS): 1.5 %
RELATIVE LYMPHOCYTE (OHS): 27.4 % (ref 18–48)
RELATIVE MONOCYTE (OHS): 6.7 % (ref 4–15)
RELATIVE NEUTROPHIL (OHS): 63.3 % (ref 38–73)
SODIUM SERPL-SCNC: 139 MMOL/L (ref 136–145)
SP GR UR STRIP: 1.02
TROPONIN I SERPL DL<=0.01 NG/ML-MCNC: <0.006 NG/ML
UROBILINOGEN UR STRIP-ACNC: NEGATIVE EU/DL
WBC # BLD AUTO: 7.96 K/UL (ref 3.9–12.7)

## 2025-03-24 PROCEDURE — 81003 URINALYSIS AUTO W/O SCOPE: CPT | Performed by: NURSE PRACTITIONER

## 2025-03-24 PROCEDURE — 96372 THER/PROPH/DIAG INJ SC/IM: CPT | Performed by: NURSE PRACTITIONER

## 2025-03-24 PROCEDURE — 81025 URINE PREGNANCY TEST: CPT | Performed by: EMERGENCY MEDICINE

## 2025-03-24 PROCEDURE — 82962 GLUCOSE BLOOD TEST: CPT

## 2025-03-24 PROCEDURE — 84484 ASSAY OF TROPONIN QUANT: CPT | Performed by: NURSE PRACTITIONER

## 2025-03-24 PROCEDURE — 93010 ELECTROCARDIOGRAM REPORT: CPT | Mod: ,,, | Performed by: INTERNAL MEDICINE

## 2025-03-24 PROCEDURE — 85025 COMPLETE CBC W/AUTO DIFF WBC: CPT | Performed by: NURSE PRACTITIONER

## 2025-03-24 PROCEDURE — 83880 ASSAY OF NATRIURETIC PEPTIDE: CPT | Performed by: NURSE PRACTITIONER

## 2025-03-24 PROCEDURE — 99284 EMERGENCY DEPT VISIT MOD MDM: CPT | Mod: 25

## 2025-03-24 PROCEDURE — 93005 ELECTROCARDIOGRAM TRACING: CPT

## 2025-03-24 PROCEDURE — 96374 THER/PROPH/DIAG INJ IV PUSH: CPT

## 2025-03-24 PROCEDURE — 63600175 PHARM REV CODE 636 W HCPCS: Performed by: NURSE PRACTITIONER

## 2025-03-24 PROCEDURE — 82040 ASSAY OF SERUM ALBUMIN: CPT | Performed by: NURSE PRACTITIONER

## 2025-03-24 RX ORDER — ORPHENADRINE CITRATE 30 MG/ML
60 INJECTION INTRAMUSCULAR; INTRAVENOUS
Status: COMPLETED | OUTPATIENT
Start: 2025-03-24 | End: 2025-03-24

## 2025-03-24 RX ORDER — CYCLOBENZAPRINE HCL 10 MG
10 TABLET ORAL 3 TIMES DAILY PRN
Qty: 15 TABLET | Refills: 0 | Status: SHIPPED | OUTPATIENT
Start: 2025-03-24 | End: 2025-03-29

## 2025-03-24 RX ORDER — KETOROLAC TROMETHAMINE 30 MG/ML
15 INJECTION, SOLUTION INTRAMUSCULAR; INTRAVENOUS
Status: COMPLETED | OUTPATIENT
Start: 2025-03-24 | End: 2025-03-24

## 2025-03-24 RX ORDER — NAPROXEN 500 MG/1
500 TABLET ORAL 2 TIMES DAILY WITH MEALS
Qty: 14 TABLET | Refills: 0 | Status: SHIPPED | OUTPATIENT
Start: 2025-03-24

## 2025-03-24 RX ADMIN — ORPHENADRINE CITRATE 60 MG: 60 INJECTION INTRAMUSCULAR; INTRAVENOUS at 11:03

## 2025-03-24 RX ADMIN — KETOROLAC TROMETHAMINE 15 MG: 30 INJECTION, SOLUTION INTRAMUSCULAR; INTRAVENOUS at 11:03

## 2025-03-24 NOTE — ED PROVIDER NOTES
Encounter Date: 3/24/2025       History     Chief Complaint   Patient presents with    Headache     Pt to ER with reports of generalized headache/ neck pain with some dizziness x few days. Pt denies N/V, CP, weakness, SOB     Chief complaint: Headache     History of present illness: Patient is a 39-year-old female who endorses 3 days of frontal headache that radiates to the back of the head that is intermittent and dull it is relieved with ibuprofen not made worse by anything.  She also reports some back pain and fatigue.  She has taken also NyQuil for it is limited relief.  Denies all symptoms with the exception of headache fever fatigue back pain. Denies falls/ injuries.    The history is provided by the patient. A  was used ([id number]).     Review of patient's allergies indicates:  No Known Allergies  Past Medical History:   Diagnosis Date    Diabetes in pregnancy     hemoglobin 6.4 in second trimester    Hypertension      Past Surgical History:   Procedure Laterality Date    HYSTEROSCOPIC POLYPECTOMY OF UTERUS N/A 7/15/2020    Procedure: POLYPECTOMY, UTERUS, HYSTEROSCOPIC;  Surgeon: Quincy Peacock MD;  Location: [facility] OR;  Service: OB/GYN;  Laterality: N/A;  MyoSure to remove endometrial polyps ERWIN ÓLPEZ [phone] WILL BE HERE FOR CASE 7-  RN PREOP 7/8/2020  T/S---UPT IN AM  MARTTI NEEDED-----COVID NEGATIVE     Family History   Problem Relation Name Age of Onset    Heart disease Mother      Hypertension Mother       Social History[1]  Review of Systems   Constitutional:  Positive for fatigue and fever (subjective).   Neurological:  Positive for headaches.       Physical Exam     Initial Vitals [03/24/25 0957]   BP Pulse Resp Temp SpO2   (!) 169/95 92 18 98.3 °F (36.8 °C) 98 %      MAP       --         Physical Exam    Nursing note and vitals reviewed.  Constitutional: She appears well-developed and well-nourished.   HENT:   Head: Normocephalic and atraumatic.   Right Ear: Hearing,  tympanic membrane, external ear and ear canal normal.   Left Ear: Hearing, tympanic membrane, external ear and ear canal normal.   Nose: Nose normal. No mucosal edema or rhinorrhea. No epistaxis. Right sinus exhibits no maxillary sinus tenderness and no frontal sinus tenderness. Left sinus exhibits no maxillary sinus tenderness and no frontal sinus tenderness. Mouth/Throat: Uvula is midline, oropharynx is clear and moist and mucous membranes are normal. No oral lesions. Normal dentition.   Eyes: Conjunctivae and EOM are normal. Pupils are equal, round, and reactive to light. Right eye exhibits no discharge. Left eye exhibits no discharge.   Neck:   Normal range of motion.  Cardiovascular:  Regular rhythm, S1 normal, S2 normal and normal heart sounds.     Exam reveals no gallop.       No murmur heard.  Pulmonary/Chest: Effort normal and breath sounds normal. No respiratory distress. She has no decreased breath sounds. She has no wheezes. She has no rhonchi. She has no rales.   Abdominal: She exhibits no distension.   Musculoskeletal:         General: Normal range of motion.      Cervical back: Normal range of motion.     Neurological: She is alert and oriented to person, place, and time. She has normal strength. No cranial nerve deficit or sensory deficit. She exhibits normal muscle tone. She displays a negative Romberg sign. Coordination and gait normal. GCS eye subscore is 4. GCS verbal subscore is 5. GCS motor subscore is 6.   Equal  strength bilaterally, equal bicep flexion and tricep extension strength, leg extension and flexion strength appropriate and equal, foot plantar- and dorsi-flexion equal and appropriate   Skin: Skin is dry. Capillary refill takes less than 2 seconds.         ED Course   Procedures  Labs Reviewed   COMPREHENSIVE METABOLIC PANEL - Abnormal       Result Value    Sodium 139      Potassium 3.7      Chloride 108      CO2 22 (*)     Glucose 102      BUN 10      Creatinine 0.7      Calcium  9.1      Protein Total 8.1      Albumin 4.1      Bilirubin Total 0.3      ALP 54      AST 14      ALT 9 (*)     Anion Gap 9      eGFR >60     URINALYSIS, REFLEX TO URINE CULTURE - Abnormal    Color, UA Yellow      Appearance, UA Hazy (*)     pH, UA 8.0      Spec Grav UA 1.020      Protein, UA Negative      Glucose, UA Negative      Ketones, UA Negative      Bilirubin, UA Negative      Blood, UA Trace (*)     Nitrites, UA Negative      Urobilinogen, UA Negative      Leukocyte Esterase, UA Negative     TROPONIN I - Normal    Troponin-I <0.006     B-TYPE NATRIURETIC PEPTIDE - Normal    BNP 35     CBC WITH DIFFERENTIAL - Normal    WBC 7.96      RBC 4.31      HGB 13.0      HCT 37.9      MCV 88      MCH 30.2      MCHC 34.3      RDW 13.1      Platelet Count 318      MPV 10.4      Nucleated RBC 0      Neut % 63.3      Lymph % 27.4      Mono % 6.7      Eos % 1.5      Basophil % 0.8      Imm Grans % 0.3      Neut # 5.05      Lymph # 2.18      Mono # 0.53      Eos # 0.12      Baso # 0.06      Imm Grans # 0.02     CBC W/ AUTO DIFFERENTIAL    Narrative:     The following orders were created for panel order CBC auto differential.  Procedure                               Abnormality         Status                     ---------                               -----------         ------                     CBC with Differential[[id number]]       Normal              Final result                 Please view results for these tests on the individual orders.   POCT URINE PREGNANCY    POC Preg Test, Ur Negative       Acceptable Yes     POCT GLUCOSE MONITORING CONTINUOUS        ECG Results              EKG 12-lead (Preliminary result)  Result time 03/24/25 11:33:19      Wet Read by Edgar Marcos DNP (03/24/25 11:33:19, [facility], Emergency Medicine)    Independent EKG interpretation of the study dated March 24, 2025 at 11:27 normal sinus rhythm no ectopy no ST elevation MI ventricular rate of 89 QTC  interval 430 milliseconds.  T-wave inversion in lead V1, nonpathogenic.                                  Imaging Results    None          Medications   ketorolac injection 15 mg (15 mg Intravenous Given 3/24/25 1138)   orphenadrine injection 60 mg (60 mg Intramuscular Given 3/24/25 1129)     Medical Decision Making  Patient is a 39-year-old female who endorses 3 days of frontal headache that radiates to the back of the head that is intermittent and dull it is relieved with ibuprofen not made worse by anything.  She also reports some back pain and fatigue.  She has taken also NyQuil for it is limited relief.  Denies all symptoms with the exception of headache fever fatigue back pain. Denies falls/ injuries.    On physical exam the patient is afebrile nontoxic in no apparent distress breath sounds are clear to auscultation heart sounds are normal abdomen soft and nontender normal HEENT exam normal neurologic exam.      Differential diagnosis includes viral syndrome COVID-19 influenza intracranial injury    Problems Addressed:  Back pain, unspecified back location, unspecified back pain laterality, unspecified chronicity: acute illness or injury     Details: Improved with Toradol and Norflex  Fatigue: acute illness or injury     Details: Normal laboratories no emergency etiology noted  Nonintractable headache, unspecified chronicity pattern, unspecified headache type: acute illness or injury     Details: Improved with Toradol and Norflex.    Amount and/or Complexity of Data Reviewed  Labs: ordered. Decision-making details documented in ED Course.  Discussion of management or test interpretation with external provider(s): Vital signs at the time of disposition were:  /86 (BP Location: Right arm, Patient Position: Sitting)   Pulse 87   Temp 98 °F (36.7 °C) (Oral)   Resp 18   Ht 5' (1.524 m)   Wt 56.7 kg (125 lb)   SpO2 99%   Breastfeeding No   BMI 24.41 kg/m²       See AVS for additional recommendations.  Medications listed herein were prescribed after reviewing the patient's allergies, medication list, history, most recent laboratories as available.  Referrals below were provided after reviewing the patient's previous medical providers. She understands she  should return for any worsening or changes in condition.  Prior to discharge the patient was asked if she  had any additional concerns or complaints and she declined. The patient was given an opportunity to ask questions and all were answered to her satisfaction.     Risk  OTC drugs.  Prescription drug management.  Diagnosis or treatment significantly limited by social determinants of health.               ED Course as of 03/24/25 1316   Mon Mar 24, 2025   1113 hCG Qualitative, Urine: Negative [VC]   1114 BP(!): 169/95 [VC]   1114 Temp: 98.3 °F (36.8 °C) [VC]   1114 Temp Source: Oral [VC]   1114 Pulse: 92 [VC]   1114 Resp: 18 [VC]   1114 SpO2: 98 % [VC]   1202 CBC auto differential  Normal cbc. [VC]   1210 Urinalysis, Reflex to Urine Culture(!)  Neg for uti. [VC]   1214 Comprehensive metabolic panel(!)  Normal cmp. [VC]   1218 Troponin I: <0.006 [VC]   1245 BP: 139/86 [VC]   1245 Temp: 98 °F (36.7 °C) [VC]   1245 Temp Source: Oral [VC]   1245 Pulse: 87 [VC]   1245 Resp: 18 [VC]   1245 SpO2: 99 % [VC]      ED Course User Index  [VC] Edgar Marcos DNP                           Clinical Impression:  Final diagnoses:  [R53.83] Fatigue  [R51.9] Nonintractable headache, unspecified chronicity pattern, unspecified headache type (Primary)  [M54.9] Back pain, unspecified back location, unspecified back pain laterality, unspecified chronicity          ED Disposition Condition    Discharge Stable          ED Prescriptions       Medication Sig Dispense Start Date End Date Auth. Provider    cyclobenzaprine (FLEXERIL) 10 MG tablet Take 1 tablet (10 mg total) by mouth 3 (three) times daily as needed for Muscle spasms. 15 tablet 3/24/2025 3/29/2025 Edgar Marcos,  ОЛЬГА    naproxen (NAPROSYN) 500 MG tablet Take 1 tablet (500 mg total) by mouth 2 (two) times daily with meals. 14 tablet 3/24/2025 -- Edgar Marcos DNP          Follow-up Information       Follow up With Specialties Details Why Contact Info    Quincy Mandujano MD Family Medicine Schedule an appointment as soon as possible for a visit   [address] [place] [id number] [phone]                 [1]   Social History  Tobacco Use    Smoking status: Never    Smokeless tobacco: Never   Substance Use Topics    Alcohol use: No    Drug use: No        Edgar Marcos DNP  03/24/25 0180

## 2025-03-25 LAB
OHS QRS DURATION: 80 MS
OHS QTC CALCULATION: 430 MS

## 2025-04-13 ENCOUNTER — HOSPITAL ENCOUNTER (EMERGENCY)
Facility: HOSPITAL | Age: 40
Discharge: HOME | End: 2025-04-13
Attending: EMERGENCY MEDICINE
Payer: COMMERCIAL

## 2025-04-13 VITALS
BODY MASS INDEX: 25.52 KG/M2 | OXYGEN SATURATION: 98 % | WEIGHT: 130 LBS | DIASTOLIC BLOOD PRESSURE: 82 MMHG | HEIGHT: 60 IN | TEMPERATURE: 98 F | RESPIRATION RATE: 12 BRPM | HEART RATE: 79 BPM | SYSTOLIC BLOOD PRESSURE: 132 MMHG

## 2025-04-13 DIAGNOSIS — R05.9 COUGH: ICD-10-CM

## 2025-04-13 DIAGNOSIS — R51.9 NONINTRACTABLE HEADACHE, UNSPECIFIED CHRONICITY PATTERN, UNSPECIFIED HEADACHE TYPE: Primary | ICD-10-CM

## 2025-04-13 DIAGNOSIS — R42 DIZZINESS: ICD-10-CM

## 2025-04-13 DIAGNOSIS — R09.81 SINUS CONGESTION: ICD-10-CM

## 2025-04-13 LAB
ABSOLUTE EOSINOPHIL (OHS): 0.27 K/UL
ABSOLUTE MONOCYTE (OHS): 0.49 K/UL (ref 0.3–1)
ABSOLUTE NEUTROPHIL COUNT (OHS): 4.26 K/UL (ref 1.8–7.7)
ALBUMIN SERPL BCP-MCNC: 4.3 G/DL (ref 3.5–5.2)
ALP SERPL-CCNC: 72 UNIT/L (ref 40–150)
ALT SERPL W/O P-5'-P-CCNC: 13 UNIT/L (ref 10–44)
ANION GAP (OHS): 10 MMOL/L (ref 8–16)
AST SERPL-CCNC: 17 UNIT/L (ref 11–45)
B-HCG UR QL: NEGATIVE
BASOPHILS # BLD AUTO: 0.06 K/UL
BASOPHILS NFR BLD AUTO: 0.8 %
BILIRUB SERPL-MCNC: 0.2 MG/DL (ref 0.1–1)
BILIRUB UR QL STRIP.AUTO: NEGATIVE
BUN SERPL-MCNC: 9 MG/DL (ref 6–20)
CALCIUM SERPL-MCNC: 8.9 MG/DL (ref 8.7–10.5)
CHLORIDE SERPL-SCNC: 107 MMOL/L (ref 95–110)
CLARITY UR: CLEAR
CO2 SERPL-SCNC: 22 MMOL/L (ref 23–29)
COLOR UR AUTO: COLORLESS
CREAT SERPL-MCNC: 0.7 MG/DL (ref 0.5–1.4)
CTP QC/QA: YES
ERYTHROCYTE [DISTWIDTH] IN BLOOD BY AUTOMATED COUNT: 13.1 % (ref 11.5–14.5)
GFR SERPLBLD CREATININE-BSD FMLA CKD-EPI: >60 ML/MIN/1.73/M2
GLUCOSE SERPL-MCNC: 113 MG/DL (ref 70–110)
GLUCOSE UR QL STRIP: NEGATIVE
HCT VFR BLD AUTO: 41.2 % (ref 37–48.5)
HGB BLD-MCNC: 13.6 GM/DL (ref 12–16)
HGB UR QL STRIP: NEGATIVE
IMM GRANULOCYTES # BLD AUTO: 0.02 K/UL (ref 0–0.04)
IMM GRANULOCYTES NFR BLD AUTO: 0.3 % (ref 0–0.5)
KETONES UR QL STRIP: NEGATIVE
LEUKOCYTE ESTERASE UR QL STRIP: NEGATIVE
LYMPHOCYTES # BLD AUTO: 2.41 K/UL (ref 1–4.8)
MCH RBC QN AUTO: 29.5 PG (ref 27–31)
MCHC RBC AUTO-ENTMCNC: 33 G/DL (ref 32–36)
MCV RBC AUTO: 89 FL (ref 82–98)
MOLECULAR STREP A: NEGATIVE
NITRITE UR QL STRIP: NEGATIVE
NUCLEATED RBC (/100WBC) (OHS): 0 /100 WBC
PH UR STRIP: 8 [PH]
PLATELET # BLD AUTO: 356 K/UL (ref 150–450)
PLATELET BLD QL SMEAR: NORMAL
PMV BLD AUTO: 10 FL (ref 9.2–12.9)
POC MOLECULAR INFLUENZA A AGN: NEGATIVE
POC MOLECULAR INFLUENZA B AGN: NEGATIVE
POTASSIUM SERPL-SCNC: 4.2 MMOL/L (ref 3.5–5.1)
PROT SERPL-MCNC: 8.6 GM/DL (ref 6–8.4)
PROT UR QL STRIP: NEGATIVE
RBC # BLD AUTO: 4.61 M/UL (ref 4–5.4)
RELATIVE EOSINOPHIL (OHS): 3.6 %
RELATIVE LYMPHOCYTE (OHS): 32.1 % (ref 18–48)
RELATIVE MONOCYTE (OHS): 6.5 % (ref 4–15)
RELATIVE NEUTROPHIL (OHS): 56.7 % (ref 38–73)
SARS-COV-2 RDRP RESP QL NAA+PROBE: NEGATIVE
SODIUM SERPL-SCNC: 139 MMOL/L (ref 136–145)
SP GR UR STRIP: 1
UROBILINOGEN UR STRIP-ACNC: NEGATIVE EU/DL
WBC # BLD AUTO: 7.51 K/UL (ref 3.9–12.7)

## 2025-04-13 PROCEDURE — 93005 ELECTROCARDIOGRAM TRACING: CPT

## 2025-04-13 PROCEDURE — 80053 COMPREHEN METABOLIC PANEL: CPT | Performed by: EMERGENCY MEDICINE

## 2025-04-13 PROCEDURE — 87635 SARS-COV-2 COVID-19 AMP PRB: CPT | Performed by: PHYSICIAN ASSISTANT

## 2025-04-13 PROCEDURE — 93010 ELECTROCARDIOGRAM REPORT: CPT | Mod: ,,, | Performed by: INTERNAL MEDICINE

## 2025-04-13 PROCEDURE — 87651 STREP A DNA AMP PROBE: CPT

## 2025-04-13 PROCEDURE — 96374 THER/PROPH/DIAG INJ IV PUSH: CPT

## 2025-04-13 PROCEDURE — 85025 COMPLETE CBC W/AUTO DIFF WBC: CPT | Performed by: EMERGENCY MEDICINE

## 2025-04-13 PROCEDURE — 87502 INFLUENZA DNA AMP PROBE: CPT

## 2025-04-13 PROCEDURE — 81025 URINE PREGNANCY TEST: CPT | Performed by: PHYSICIAN ASSISTANT

## 2025-04-13 PROCEDURE — 81003 URINALYSIS AUTO W/O SCOPE: CPT | Performed by: PHYSICIAN ASSISTANT

## 2025-04-13 PROCEDURE — 63600175 PHARM REV CODE 636 W HCPCS: Mod: JZ,TB | Performed by: EMERGENCY MEDICINE

## 2025-04-13 PROCEDURE — 99285 EMERGENCY DEPT VISIT HI MDM: CPT | Mod: 25

## 2025-04-13 RX ORDER — METOCLOPRAMIDE 10 MG/1
5 TABLET ORAL EVERY 6 HOURS PRN
Qty: 30 TABLET | Refills: 0 | Status: SHIPPED | OUTPATIENT
Start: 2025-04-13

## 2025-04-13 RX ORDER — IBUPROFEN 400 MG/1
400 TABLET ORAL EVERY 6 HOURS PRN
Qty: 20 TABLET | Refills: 0 | Status: SHIPPED | OUTPATIENT
Start: 2025-04-13

## 2025-04-13 RX ORDER — OXYMETAZOLINE HCL 0.05 %
1 SPRAY, NON-AEROSOL (ML) NASAL 2 TIMES DAILY
Qty: 15 ML | Refills: 0 | Status: SHIPPED | OUTPATIENT
Start: 2025-04-13 | End: 2025-04-16

## 2025-04-13 RX ORDER — KETOROLAC TROMETHAMINE 30 MG/ML
15 INJECTION, SOLUTION INTRAMUSCULAR; INTRAVENOUS
Status: COMPLETED | OUTPATIENT
Start: 2025-04-13 | End: 2025-04-13

## 2025-04-13 RX ADMIN — KETOROLAC TROMETHAMINE 15 MG: 30 INJECTION, SOLUTION INTRAMUSCULAR; INTRAVENOUS at 11:04

## 2025-04-13 NOTE — ED PROVIDER NOTES
Encounter Date: 4/13/2025       History     Chief Complaint   Patient presents with    Dizziness     Pt reports high BP and dizziness even though she is compliant with her medications.   Pt states she also have cough and sore throat.       Chief complaint:  Dizziness     History of present illness: 39-year-old female with a past medical history of hypertension, diabetes in pregnancy, presents emergency department for evaluation of symptoms that have been going on for a proximally 2 days.  Patient reports that she had a frontal headache 2 weeks ago.  She was seen and evaluated.  She reports that 2 days ago, she developed cough and sore throat.  She reports intermittent frontal headaches during that time.  She states that she also feels as though she is off balance.  She denies lightheadedness or loss of consciousness.  No chest pain or shortness of breath.  No abdominal pain or vomiting.  Patient also concerned about elevated blood pressure.  She reports taking her antihypertensive medications but states that her blood pressure has been remaining high at home.  She reports an episode of binocular blurred vision that occurred 2 days ago while she was at work which resolved spontaneously.        Review of patient's allergies indicates:  No Known Allergies  Past Medical History:   Diagnosis Date    Diabetes in pregnancy     hemoglobin 6.4 in second trimester    Hypertension      Past Surgical History:   Procedure Laterality Date    HYSTEROSCOPIC POLYPECTOMY OF UTERUS N/A 7/15/2020    Procedure: POLYPECTOMY, UTERUS, HYSTEROSCOPIC;  Surgeon: Quincy Peacock MD;  Location: [facility] OR;  Service: OB/GYN;  Laterality: N/A;  MyoSure to remove endometrial polyps ERWIN LÓPEZ [phone] WILL BE HERE FOR CASE 7-  RN PREOP 7/8/2020  T/S---UPT IN AM  MARTTI NEEDED-----COVID NEGATIVE     Family History   Problem Relation Name Age of Onset    Heart disease Mother      Hypertension Mother       Social History[1]  Review of Systems    Constitutional:  Negative for fever.   HENT:  Negative for sore throat.    Respiratory:  Negative for shortness of breath.    Cardiovascular:  Negative for chest pain.   Gastrointestinal:  Negative for abdominal pain, nausea and vomiting.   Genitourinary:  Negative for dysuria.   Musculoskeletal:  Negative for back pain.   Skin:  Negative for rash.   Neurological:  Positive for weakness. Negative for syncope, speech difficulty and light-headedness.   Hematological:  Does not bruise/bleed easily.   Psychiatric/Behavioral:  Negative for behavioral problems and confusion.        Physical Exam     Initial Vitals [04/13/25 1018]   BP Pulse Resp Temp SpO2   (!) 181/94 101 20 98 °F (36.7 °C) 97 %      MAP       --         Physical Exam    Nursing note and vitals reviewed.  Constitutional: She appears well-developed and well-nourished. She is not diaphoretic. No distress.   HENT:   Head: Normocephalic and atraumatic.   Right Ear: External ear normal.   Left Ear: External ear normal.   Nose: Nose normal. Mouth/Throat: Oropharynx is clear and moist. No oropharyngeal exudate.   Eyes: Conjunctivae and EOM are normal. Pupils are equal, round, and reactive to light. Right eye exhibits no discharge. Left eye exhibits no discharge. No scleral icterus.   Neck: Neck supple.   Normal range of motion.  Cardiovascular:  Normal rate, regular rhythm, normal heart sounds and intact distal pulses.           No murmur heard.  Pulmonary/Chest: Breath sounds normal. No respiratory distress. She has no wheezes. She has no rhonchi. She has no rales.   Abdominal: Abdomen is soft. Bowel sounds are normal. She exhibits no distension and no mass. There is no abdominal tenderness. There is no rebound and no guarding.   Musculoskeletal:         General: No tenderness or edema. Normal range of motion.      Cervical back: Normal range of motion and neck supple.     Neurological: She is alert and oriented to person, place, and time. She has normal  strength. No cranial nerve deficit or sensory deficit.   GCS is 15.  Patient is alert and oriented to person, place, time, and events.  To be right beating horizontal nystagmus noted.  No bilateral nystagmus.  No vertical nystagmus.  Cranial nerves II-XII intact by exam.  Strength and sensation equal and intact in all 4 extremities.  There is no evidence of ataxia -- walks unassisted with a normal gait.  Finger-to-nose exam normal.   Skin: Skin is warm and dry. No rash noted. No erythema. No pallor.   Psychiatric: She has a normal mood and affect. Her behavior is normal. Judgment and thought content normal.         ED Course   Procedures  Labs Reviewed   URINALYSIS, REFLEX TO URINE CULTURE - Abnormal       Result Value    Color, UA Colorless (*)     Appearance, UA Clear      pH, UA 8.0      Spec Grav UA 1.005      Protein, UA Negative      Glucose, UA Negative      Ketones, UA Negative      Bilirubin, UA Negative      Blood, UA Negative      Nitrites, UA Negative      Urobilinogen, UA Negative      Leukocyte Esterase, UA Negative     COMPREHENSIVE METABOLIC PANEL - Abnormal    Sodium 139      Potassium 4.2      Chloride 107      CO2 22 (*)     Glucose 113 (*)     BUN 9      Creatinine 0.7      Calcium 8.9      Protein Total 8.6 (*)     Albumin 4.3      Bilirubin Total 0.2      ALP 72      AST 17      ALT 13      Anion Gap 10      eGFR >60      Narrative:     Specimen slightly hemolyzed.   CBC WITH DIFFERENTIAL - Normal    WBC 7.51      RBC 4.61      HGB 13.6      HCT 41.2      MCV 89      MCH 29.5      MCHC 33.0      RDW 13.1      Platelet Count 356      MPV 10.0      Nucleated RBC 0      Neut % 56.7      Lymph % 32.1      Mono % 6.5      Eos % 3.6      Basophil % 0.8      Imm Grans % 0.3      Neut # 4.26      Lymph # 2.41      Mono # 0.49      Eos # 0.27      Baso # 0.06      Imm Grans # 0.02     PLATELET REVIEW - Normal    Platelet Estimate Appears Normal     CBC W/ AUTO DIFFERENTIAL    Narrative:     The  following orders were created for panel order CBC Auto Differential.  Procedure                               Abnormality         Status                     ---------                               -----------         ------                     CBC with Differential[[id number]]       Normal              Final result                 Please view results for these tests on the individual orders.   GREY TOP URINE HOLD   POCT URINE PREGNANCY    POC Preg Test, Ur Negative       Acceptable Yes     POCT STREP A MOLECULAR    Molecular Strep A, POC Negative       Acceptable Yes     SARS-COV-2 RDRP GENE    POC Rapid COVID Negative       Acceptable Yes     POCT INFLUENZA A/B MOLECULAR    POC Molecular Influenza A Ag Negative      POC Molecular Influenza B Ag Negative       Acceptable Yes       EKG Readings: (Independently Interpreted)   Initial Reading: No STEMI. Ectopy: No Ectopy.   EKG reviewed and interpreted by me shows sinus rhythm at a rate of 89 beats per minute.  The AR, QRS, QTC intervals are within normal limits.  There is normal axis.  There is normal R-wave progression across the precordium.  There are no ST segment or T-wave ischemic findings.         Imaging Results              CT Head Without Contrast (Final result)  Result time 04/13/25 12:10:46      Final result by Charan Myles MD (04/13/25 12:10:46)                   Impression:      1. Allowing for motion artifact, no convincing acute intracranial abnormalities.  2. Sinus disease.      Electronically signed by: Charan Myles MD  Date:    04/13/2025  Time:    12:10               Narrative:    EXAMINATION:  CT HEAD WITHOUT CONTRAST    CLINICAL HISTORY:  Headache, chronic, new features or increased frequency;    TECHNIQUE:  Low dose axial images were obtained through the head.  Coronal and sagittal reformations were also performed. Contrast was not  administered.    COMPARISON:  None.    FINDINGS:  There is motion artifact.    There is no evidence of acute major vascular territory infarct, hemorrhage, or mass.  There is no hydrocephalus.  There are no abnormal extra-axial fluid collections.  There is near complete opacification of the right maxillary sinus.  There is layering aerated secretion within the left maxillary sinus.  There is patchy opacification of the ethmoid sinuses and frontal sinuses.  The mastoid air cells are clear.  No acute displaced calvarial fracture.  The visualized soft tissues are unremarkable.                                       X-Ray Chest AP Portable (Final result)  Result time 04/13/25 12:14:30      Final result by Fito Patel MD (04/13/25 12:14:30)                   Impression:      No acute abnormality.      Electronically signed by: Fito Patel MD  Date:    04/13/2025  Time:    12:14               Narrative:    EXAMINATION:  XR CHEST AP PORTABLE    CLINICAL HISTORY:  Cough, unspecified    TECHNIQUE:  Single frontal view of the chest was performed.    COMPARISON:  Chest radiograph from 03/26/2017    FINDINGS:  The lungs are clear, with normal appearance of pulmonary vasculature and no pleural effusion or pneumothorax.    The cardiac silhouette is normal in size. The hilar and mediastinal contours are unremarkable.    Bones are intact.                                    X-Rays:   Independently Interpreted Readings:   Other Readings:  Chest x-ray reveals no evidence of infiltrate, consolidation, pleural effusion, pulmonary edema, or pneumothorax.    Head CT reveals no evidence of intracranial hemorrhage or mass effect.  Opacified right maxillary sinus.    Medications   ketorolac injection 15 mg (15 mg Intravenous Given 4/13/25 1115)     Medical Decision Making  This is the emergent evaluation of a 39-year-old female presents emergency department for evaluation of concerns about high blood pressure, intermittent  "frontal headache, feeling of generalized fatigue/weakness, and feeling like her "balance is off".  Also reports cough and sore throat.  Differential diagnosis at the time of initial evaluation included, but was not limited to:  Peripheral vertigo, viral illness, dehydration, acute kidney injury.  I do not suspect a posterior circulation stroke.  This patient is ambulating without assistance and without difficulty.  There is no dysmetria on finger-to-nose examination.  Her neurologic status is intact in reassuring.  No focal deficits.  I reviewed the chart and noted the patient has not had any imaging of her head.  I will obtain CT scan of the head today.  CT scan of the head reveals no acute intracranial abnormality such as intracranial hemorrhage or mass effect.  There is near complete opacification of the right maxillary sinus, consistent with the patient's history of nasal congestion.    I will prescribe decongestant nasal spray as well as medications as needed for headache.  Patient's blood pressure at the time of discharge is 132/82.  Advised her to follow up with her PCP regarding intermittent elevated blood pressure.  No evidence of hypertensive crisis/end-organ dysfunction.  Labs reassuring today.  Patient did have a question unilateral horizontal right beating nystagmus which was fatigable after 1-2 beats.  However, no symptoms to suggest definite persistent peripheral vertigo.  I have advised her to return if she gets worse or if new symptoms develop.    Amount and/or Complexity of Data Reviewed  Independent Historian:      Details: Welsh  [id number] at initial evaluation  Welsh  [id number] at the time of discharge/re-evaluation.  Labs: ordered. Decision-making details documented in ED Course.     Details: There is no significant metabolic derangement, REMIGIO, transaminitis, or hyperbilirubinemia.  No evidence of urinary tract infection.  There is no leukocytosis or anemia.  Normal " platelet count.  No left shift on CBC.  Pregnancy test negative.  Viral swabs negative for influenza, COVID.  Strep negative.  Radiology: ordered and independent interpretation performed. Decision-making details documented in ED Course.  ECG/medicine tests: ordered and independent interpretation performed. Decision-making details documented in ED Course.    Risk  OTC drugs.  Prescription drug management.                                      Clinical Impression:  Final diagnoses:  [R42] Dizziness  [R05.9] Cough  [R51.9] Nonintractable headache, unspecified chronicity pattern, unspecified headache type (Primary)  [R09.81] Sinus congestion          ED Disposition Condition    Discharge Stable          ED Prescriptions       Medication Sig Dispense Start Date End Date Auth. Provider    ibuprofen (ADVIL,MOTRIN) 400 MG tablet Take 1 tablet (400 mg total) by mouth every 6 (six) hours as needed for Other (headache). 20 tablet 4/13/2025 -- Rafa Strong Jr., MD    metoclopramide HCl (REGLAN) 10 MG tablet Take 0.5 tablets (5 mg total) by mouth every 6 (six) hours as needed (headache). 30 tablet 4/13/2025 -- Rafa Strong Jr., MD    oxymetazoline (AFRIN) 0.05 % nasal spray 1 spray by Nasal route 2 (two) times daily. for 3 days 15 mL 4/13/2025 4/16/2025 Rafa Strong Jr., MD          Follow-up Information       Follow up With Specialties Details Why Contact Info    Quincy Mandujano MD Family Medicine Schedule an appointment as soon as possible for a visit in 1 week  [address] [facility] [address] [phone]                 Rafa Strong Jr., MD  04/13/25 1322         [1]   Social History  Tobacco Use    Smoking status: Never    Smokeless tobacco: Never   Substance Use Topics    Alcohol use: No    Drug use: No        Rafa Strong Jr., MD  04/13/25 [id number]

## 2025-04-13 NOTE — DISCHARGE INSTRUCTIONS
Please follow up with your regular doctor regarding evaluation of blood pressure and determination of need to change blood pressure medication regimen.  Medications as prescribed for symptoms.  Return if you get worse or if new symptoms develop.    Vui lòng marisol dõi v?i bác s? th??ng xuyên c?a b?n v? vi?c ?ánh giá demetrius?t áp và xác ??nh oswaldo c?u thay ??i ch? ?? dùng thu?c demetrius?t áp. Thu?c marisol ??n cho các tri?u ch?ng. Zane l?i n?u b?n tr? nên t?i t? h?n ho?c n?u các tri?u ch?ng m?i phát tri?n.

## 2025-04-13 NOTE — ED TRIAGE NOTES
"Pt presented to the ED with complaints of hypertension and dizziness since Friday. Pt reports being compliant with at home medications. Pt reports headache is intermittent and "comes in waves". Pt also reports "numbness in fingertips after doing manicures and pedicures for too long". Pt denies worsening with loud noises or photophobia. Pt also reports cough and sore throat. Pt denies fever, shortness of breath, or chest pain.   "

## 2025-04-15 LAB
OHS QRS DURATION: 86 MS
OHS QTC CALCULATION: 447 MS

## (undated) DEVICE — SUPPORT ULNA NERVE PROTECTOR

## (undated) DEVICE — CATH SELF-CATH FEMALE 14FR 6IN

## (undated) DEVICE — SEE MEDLINE ITEM 157110

## (undated) DEVICE — MAT QUICK 40X30 FLOOR FLUID LF

## (undated) DEVICE — KIT ANTIFOG

## (undated) DEVICE — SEE MEDLINE ITEM 157181

## (undated) DEVICE — GLOVE BIOGEL PI MICRO SZ 7

## (undated) DEVICE — SEE MEDLINE ITEM 152531

## (undated) DEVICE — PAD PREP 50/CA

## (undated) DEVICE — SEE MEDLINE ITEM 156946

## (undated) DEVICE — POSITIONER HEAD DONUT 9IN FOAM

## (undated) DEVICE — DEVICE MYOSURE REACH

## (undated) DEVICE — JELLY LUBRICANT STERILE 4 OZ

## (undated) DEVICE — SEE L#120831

## (undated) DEVICE — TUBE AQUILEX OUTFLOW

## (undated) DEVICE — CANISTER SUCTION 2 LTR

## (undated) DEVICE — TUBE AQUILEX INFLOW

## (undated) DEVICE — Device

## (undated) DEVICE — DRESSING TELFA N ADH 3X8

## (undated) DEVICE — GLOVE SURGICAL LATEX SZ 7

## (undated) DEVICE — SEE MEDLINE ITEM 154981